# Patient Record
Sex: MALE | Race: WHITE
[De-identification: names, ages, dates, MRNs, and addresses within clinical notes are randomized per-mention and may not be internally consistent; named-entity substitution may affect disease eponyms.]

---

## 2020-10-27 ENCOUNTER — HOSPITAL ENCOUNTER (INPATIENT)
Dept: HOSPITAL 8 - ED | Age: 50
LOS: 3 days | Discharge: LEFT BEFORE DISCHARGE | DRG: 190 | End: 2020-10-30
Attending: INTERNAL MEDICINE | Admitting: FAMILY MEDICINE
Payer: MEDICAID

## 2020-10-27 VITALS — BODY MASS INDEX: 31.45 KG/M2 | WEIGHT: 200.4 LBS | HEIGHT: 67 IN

## 2020-10-27 DIAGNOSIS — F15.90: ICD-10-CM

## 2020-10-27 DIAGNOSIS — I50.31: ICD-10-CM

## 2020-10-27 DIAGNOSIS — Z82.49: ICD-10-CM

## 2020-10-27 DIAGNOSIS — I51.3: ICD-10-CM

## 2020-10-27 DIAGNOSIS — I25.10: ICD-10-CM

## 2020-10-27 DIAGNOSIS — I42.0: ICD-10-CM

## 2020-10-27 DIAGNOSIS — I26.99: ICD-10-CM

## 2020-10-27 DIAGNOSIS — I21.A1: Primary | ICD-10-CM

## 2020-10-27 DIAGNOSIS — E78.5: ICD-10-CM

## 2020-10-27 DIAGNOSIS — E87.1: ICD-10-CM

## 2020-10-27 DIAGNOSIS — N17.9: ICD-10-CM

## 2020-10-27 DIAGNOSIS — I27.20: ICD-10-CM

## 2020-10-27 DIAGNOSIS — E87.6: ICD-10-CM

## 2020-10-27 DIAGNOSIS — F17.210: ICD-10-CM

## 2020-10-27 DIAGNOSIS — Z91.19: ICD-10-CM

## 2020-10-27 DIAGNOSIS — K76.1: ICD-10-CM

## 2020-10-27 DIAGNOSIS — Z95.5: ICD-10-CM

## 2020-10-27 DIAGNOSIS — I25.5: ICD-10-CM

## 2020-10-27 DIAGNOSIS — Z91.14: ICD-10-CM

## 2020-10-27 LAB
BASOPHILS # BLD AUTO: 0 X10^3/UL (ref 0–0.1)
BASOPHILS NFR BLD AUTO: 0 % (ref 0–1)
EOSINOPHIL # BLD AUTO: 0.2 X10^3/UL (ref 0–0.4)
EOSINOPHIL NFR BLD AUTO: 2 % (ref 1–7)
ERYTHROCYTE [DISTWIDTH] IN BLOOD BY AUTOMATED COUNT: 14.9 % (ref 9.4–14.8)
LYMPHOCYTES # BLD AUTO: 2.2 X10^3/UL (ref 1–3.4)
LYMPHOCYTES NFR BLD AUTO: 27 % (ref 22–44)
MCH RBC QN AUTO: 30.6 PG (ref 27.5–34.5)
MCHC RBC AUTO-ENTMCNC: 32.9 G/DL (ref 33.2–36.2)
MD: NO
MONOCYTES # BLD AUTO: 0.9 X10^3/UL (ref 0.2–0.8)
MONOCYTES NFR BLD AUTO: 11 % (ref 2–9)
NEUTROPHILS # BLD AUTO: 5 X10^3/UL (ref 1.8–6.8)
NEUTROPHILS NFR BLD AUTO: 60 % (ref 42–75)
PLATELET # BLD AUTO: 220 X10^3/UL (ref 130–400)
PMV BLD AUTO: 9.7 FL (ref 7.4–10.4)
RBC # BLD AUTO: 5.39 X10^6/UL (ref 4.38–5.82)
TROPONIN I SERPL-MCNC: 0.25 NG/ML (ref 0–0.04)
TROPONIN I SERPL-MCNC: 0.27 NG/ML (ref 0–0.04)

## 2020-10-27 PROCEDURE — 80053 COMPREHEN METABOLIC PANEL: CPT

## 2020-10-27 PROCEDURE — 85610 PROTHROMBIN TIME: CPT

## 2020-10-27 PROCEDURE — 36415 COLL VENOUS BLD VENIPUNCTURE: CPT

## 2020-10-27 PROCEDURE — 93306 TTE W/DOPPLER COMPLETE: CPT

## 2020-10-27 PROCEDURE — 80061 LIPID PANEL: CPT

## 2020-10-27 PROCEDURE — 93005 ELECTROCARDIOGRAM TRACING: CPT

## 2020-10-27 PROCEDURE — 80048 BASIC METABOLIC PNL TOTAL CA: CPT

## 2020-10-27 PROCEDURE — 99285 EMERGENCY DEPT VISIT HI MDM: CPT

## 2020-10-27 PROCEDURE — 85520 HEPARIN ASSAY: CPT

## 2020-10-27 PROCEDURE — 93970 EXTREMITY STUDY: CPT

## 2020-10-27 PROCEDURE — 84443 ASSAY THYROID STIM HORMONE: CPT

## 2020-10-27 PROCEDURE — 84484 ASSAY OF TROPONIN QUANT: CPT

## 2020-10-27 PROCEDURE — 80076 HEPATIC FUNCTION PANEL: CPT

## 2020-10-27 PROCEDURE — 71275 CT ANGIOGRAPHY CHEST: CPT

## 2020-10-27 PROCEDURE — 71045 X-RAY EXAM CHEST 1 VIEW: CPT

## 2020-10-27 PROCEDURE — 85025 COMPLETE CBC W/AUTO DIFF WBC: CPT

## 2020-10-27 RX ADMIN — ENOXAPARIN SODIUM SCH MG: 40 INJECTION SUBCUTANEOUS at 17:30

## 2020-10-27 NOTE — NUR
RN called Dr. Salazar, admitting MD. Troponin's trending down. No complaints of SOB 
or CP. Told to stop heparin gtt and hold Lovenox dose until tomorrow. WCTM.

## 2020-10-27 NOTE — NUR
PT SENT FROM [facility]. Hx MI, CHF, METH USE. HASN'T TAKEN LASIX x 2 MONTHS UNTIL 
2 DAYS AGO. STATES "IT WASN'T WORKING" AND PRESENTED TO ER ORIGINALLY WITH SOB. 
TROP 0.26, BNP N933, GIVEN 324MG ASA, 2MG MORPHINE, AND A SHOT OF TORADOL. 
ARRIVES AFTER BOLUS OF HEPARIN RUNNING NOW AT11.7 U/HR. SENDING FACILITY 
REPORTS EKG SHOWS ANTERIOR MI, WITH RECENT Hx OF MI WITHIN LAST MONTH OR SO. PT 
STATES HE STOPPED USING METH 4 DAYS AGO. DENIES PAIN. 18G LAC

## 2020-10-27 NOTE — NUR
RN taking over for patient. Pt is on heparin gtt from transfering facility. RN 
attempted to discuss with EDMD of whether to keep the heparin gtt going. Told 
to call hospitalist.

## 2020-10-28 VITALS — SYSTOLIC BLOOD PRESSURE: 96 MMHG | DIASTOLIC BLOOD PRESSURE: 67 MMHG

## 2020-10-28 VITALS — SYSTOLIC BLOOD PRESSURE: 105 MMHG | DIASTOLIC BLOOD PRESSURE: 47 MMHG

## 2020-10-28 LAB
ALBUMIN SERPL-MCNC: 3.1 G/DL (ref 3.4–5)
ALP SERPL-CCNC: 140 U/L (ref 45–117)
ALT SERPL-CCNC: 250 U/L (ref 12–78)
ANION GAP SERPL CALC-SCNC: 10 MMOL/L (ref 5–15)
ANION GAP SERPL CALC-SCNC: 8 MMOL/L (ref 5–15)
BASOPHILS # BLD AUTO: 0.1 X10^3/UL (ref 0–0.1)
BASOPHILS NFR BLD AUTO: 1 % (ref 0–1)
BILIRUB SERPL-MCNC: 3.5 MG/DL (ref 0.2–1)
CALCIUM SERPL-MCNC: 9.1 MG/DL (ref 8.5–10.1)
CALCIUM SERPL-MCNC: 9.4 MG/DL (ref 8.5–10.1)
CHLORIDE SERPL-SCNC: 104 MMOL/L (ref 98–107)
CHLORIDE SERPL-SCNC: 104 MMOL/L (ref 98–107)
CREAT SERPL-MCNC: 1.29 MG/DL (ref 0.7–1.3)
CREAT SERPL-MCNC: 1.41 MG/DL (ref 0.7–1.3)
EOSINOPHIL # BLD AUTO: 0.3 X10^3/UL (ref 0–0.4)
EOSINOPHIL NFR BLD AUTO: 4 % (ref 1–7)
ERYTHROCYTE [DISTWIDTH] IN BLOOD BY AUTOMATED COUNT: 15.1 % (ref 9.4–14.8)
LYMPHOCYTES # BLD AUTO: 1.6 X10^3/UL (ref 1–3.4)
LYMPHOCYTES NFR BLD AUTO: 18 % (ref 22–44)
MCH RBC QN AUTO: 30.4 PG (ref 27.5–34.5)
MCHC RBC AUTO-ENTMCNC: 33 G/DL (ref 33.2–36.2)
MD: (no result)
MONOCYTES # BLD AUTO: 0.9 X10^3/UL (ref 0.2–0.8)
MONOCYTES NFR BLD AUTO: 10 % (ref 2–9)
NEUTROPHILS # BLD AUTO: 5.9 X10^3/UL (ref 1.8–6.8)
NEUTROPHILS NFR BLD AUTO: 67 % (ref 42–75)
PLATELET # BLD AUTO: 214 X10^3/UL (ref 130–400)
PMV BLD AUTO: 10.1 FL (ref 7.4–10.4)
PROT SERPL-MCNC: 6.8 G/DL (ref 6.4–8.2)
RBC # BLD AUTO: 5.37 X10^6/UL (ref 4.38–5.82)
TROPONIN I SERPL-MCNC: 0.26 NG/ML (ref 0–0.04)

## 2020-10-28 RX ADMIN — ENOXAPARIN SODIUM SCH MG: 40 INJECTION SUBCUTANEOUS at 18:03

## 2020-10-28 RX ADMIN — ISOSORBIDE MONONITRATE SCH MG: 30 TABLET, EXTENDED RELEASE ORAL at 09:20

## 2020-10-28 RX ADMIN — FUROSEMIDE SCH MG: 10 INJECTION, SOLUTION INTRAMUSCULAR; INTRAVENOUS at 09:20

## 2020-10-28 RX ADMIN — SPIRONOLACTONE SCH MG: 25 TABLET ORAL at 09:20

## 2020-10-28 RX ADMIN — ATORVASTATIN CALCIUM SCH MG: 40 TABLET, FILM COATED ORAL at 20:53

## 2020-10-28 RX ADMIN — LISINOPRIL SCH MG: 5 TABLET ORAL at 12:30

## 2020-10-28 RX ADMIN — PRASUGREL SCH MG: 10 TABLET, FILM COATED ORAL at 09:20

## 2020-10-28 RX ADMIN — ASPIRIN 81 MG SCH MG: 81 TABLET ORAL at 09:20

## 2020-10-28 NOTE — NUR
THROUGHPUT RN: DR. BAEZ'S ADMIT ORDER NOTED TO NOT HAVE LEVEL OF CARE. DISCUSSED 
W/ [facility] DR. PANDA. NEW ORDERS FOR PT TO BE ADMITTED TO CARDIAC TELEMETRY. VERBAL 
READ BACK.

## 2020-10-28 NOTE — NUR
REPORT FROM NOC RN, PT RESTING ON [employer] AT THIS TIME, NAD NOTED. VSS. SLIP 
SENT TO PHARM FOR AM MEDS, MEAL TRAY ORDERED

## 2020-10-29 VITALS — SYSTOLIC BLOOD PRESSURE: 109 MMHG | DIASTOLIC BLOOD PRESSURE: 81 MMHG

## 2020-10-29 VITALS — DIASTOLIC BLOOD PRESSURE: 71 MMHG | SYSTOLIC BLOOD PRESSURE: 101 MMHG

## 2020-10-29 VITALS — DIASTOLIC BLOOD PRESSURE: 87 MMHG | SYSTOLIC BLOOD PRESSURE: 118 MMHG

## 2020-10-29 VITALS — DIASTOLIC BLOOD PRESSURE: 83 MMHG | SYSTOLIC BLOOD PRESSURE: 109 MMHG

## 2020-10-29 VITALS — SYSTOLIC BLOOD PRESSURE: 111 MMHG | DIASTOLIC BLOOD PRESSURE: 80 MMHG

## 2020-10-29 LAB
ANION GAP SERPL CALC-SCNC: 9 MMOL/L (ref 5–15)
CALCIUM SERPL-MCNC: 8.6 MG/DL (ref 8.5–10.1)
CHLORIDE SERPL-SCNC: 105 MMOL/L (ref 98–107)
CHOL/HDL RATIO: 4.6
CREAT SERPL-MCNC: 1.17 MG/DL (ref 0.7–1.3)
HDL CHOL %: 22 % (ref 26–37)
HDL CHOLESTEROL (DIRECT): 23 MG/DL (ref 40–60)
LDL CHOLESTEROL,CALCULATED: 63 MG/DL (ref 54–169)
LDLC/HDLC SERPL: 2.7 {RATIO} (ref 0.5–3)
TRIGL SERPL-MCNC: 99 MG/DL (ref 50–200)
VLDLC SERPL CALC-MCNC: 20 MG/DL (ref 0–25)

## 2020-10-29 RX ADMIN — ISOSORBIDE MONONITRATE SCH MG: 30 TABLET, EXTENDED RELEASE ORAL at 12:01

## 2020-10-29 RX ADMIN — LISINOPRIL SCH MG: 5 TABLET ORAL at 12:01

## 2020-10-29 RX ADMIN — FUROSEMIDE SCH MG: 10 INJECTION, SOLUTION INTRAMUSCULAR; INTRAVENOUS at 12:00

## 2020-10-29 RX ADMIN — ATORVASTATIN CALCIUM SCH MG: 40 TABLET, FILM COATED ORAL at 21:18

## 2020-10-29 RX ADMIN — SPIRONOLACTONE SCH MG: 25 TABLET ORAL at 12:01

## 2020-10-29 RX ADMIN — HEPARIN SODIUM PRN UNITS: 5000 INJECTION, SOLUTION INTRAVENOUS; SUBCUTANEOUS at 21:18

## 2020-10-29 RX ADMIN — Medication SCH MG: at 04:51

## 2020-10-29 RX ADMIN — PRASUGREL SCH MG: 10 TABLET, FILM COATED ORAL at 12:01

## 2020-10-29 RX ADMIN — ASPIRIN 81 MG SCH MG: 81 TABLET ORAL at 12:01

## 2020-10-30 VITALS — SYSTOLIC BLOOD PRESSURE: 101 MMHG | DIASTOLIC BLOOD PRESSURE: 66 MMHG

## 2020-10-30 VITALS — DIASTOLIC BLOOD PRESSURE: 73 MMHG | SYSTOLIC BLOOD PRESSURE: 102 MMHG

## 2020-10-30 VITALS — DIASTOLIC BLOOD PRESSURE: 66 MMHG | SYSTOLIC BLOOD PRESSURE: 96 MMHG

## 2020-10-30 VITALS — SYSTOLIC BLOOD PRESSURE: 100 MMHG | DIASTOLIC BLOOD PRESSURE: 69 MMHG

## 2020-10-30 LAB
ALBUMIN SERPL-MCNC: 3 G/DL (ref 3.4–5)
ALP SERPL-CCNC: 155 U/L (ref 45–117)
ALT SERPL-CCNC: 218 U/L (ref 12–78)
ANION GAP SERPL CALC-SCNC: 10 MMOL/L (ref 5–15)
BASOPHILS # BLD AUTO: 0.1 X10^3/UL (ref 0–0.1)
BASOPHILS NFR BLD AUTO: 1 % (ref 0–1)
BILIRUB SERPL-MCNC: 3.1 MG/DL (ref 0.2–1)
CALCIUM SERPL-MCNC: 8.4 MG/DL (ref 8.5–10.1)
CHLORIDE SERPL-SCNC: 102 MMOL/L (ref 98–107)
CREAT SERPL-MCNC: 0.98 MG/DL (ref 0.7–1.3)
EOSINOPHIL # BLD AUTO: 0.4 X10^3/UL (ref 0–0.4)
EOSINOPHIL NFR BLD AUTO: 5 % (ref 1–7)
ERYTHROCYTE [DISTWIDTH] IN BLOOD BY AUTOMATED COUNT: 14.5 % (ref 9.4–14.8)
INR PPP: 1.33 (ref 0.93–1.1)
LYMPHOCYTES # BLD AUTO: 1.7 X10^3/UL (ref 1–3.4)
LYMPHOCYTES NFR BLD AUTO: 21 % (ref 22–44)
MCH RBC QN AUTO: 30.6 PG (ref 27.5–34.5)
MCHC RBC AUTO-ENTMCNC: 33.3 G/DL (ref 33.2–36.2)
MD: NO
MONOCYTES # BLD AUTO: 0.7 X10^3/UL (ref 0.2–0.8)
MONOCYTES NFR BLD AUTO: 9 % (ref 2–9)
NEUTROPHILS # BLD AUTO: 5.1 X10^3/UL (ref 1.8–6.8)
NEUTROPHILS NFR BLD AUTO: 65 % (ref 42–75)
PLATELET # BLD AUTO: 210 X10^3/UL (ref 130–400)
PMV BLD AUTO: 10 FL (ref 7.4–10.4)
PROT SERPL-MCNC: 6.8 G/DL (ref 6.4–8.2)
PROTHROMBIN TIME: 14.1 SECONDS (ref 9.6–11.5)
RBC # BLD AUTO: 4.96 X10^6/UL (ref 4.38–5.82)

## 2020-10-30 RX ADMIN — PRASUGREL SCH MG: 10 TABLET, FILM COATED ORAL at 10:19

## 2020-10-30 RX ADMIN — SPIRONOLACTONE SCH MG: 25 TABLET ORAL at 10:19

## 2020-10-30 RX ADMIN — ISOSORBIDE MONONITRATE SCH MG: 30 TABLET, EXTENDED RELEASE ORAL at 10:19

## 2020-10-30 RX ADMIN — FUROSEMIDE SCH MG: 10 INJECTION, SOLUTION INTRAMUSCULAR; INTRAVENOUS at 10:19

## 2020-10-30 RX ADMIN — LISINOPRIL SCH MG: 5 TABLET ORAL at 10:19

## 2020-10-30 RX ADMIN — ASPIRIN 81 MG SCH MG: 81 TABLET ORAL at 10:19

## 2020-10-30 RX ADMIN — Medication SCH MG: at 06:08

## 2020-10-30 RX ADMIN — HEPARIN SODIUM PRN UNITS: 5000 INJECTION, SOLUTION INTRAVENOUS; SUBCUTANEOUS at 04:34

## 2020-11-08 ENCOUNTER — HOSPITAL ENCOUNTER (INPATIENT)
Facility: MEDICAL CENTER | Age: 50
LOS: 4 days | DRG: 291 | End: 2020-11-13
Attending: EMERGENCY MEDICINE | Admitting: INTERNAL MEDICINE
Payer: MEDICAID

## 2020-11-08 ENCOUNTER — APPOINTMENT (OUTPATIENT)
Dept: RADIOLOGY | Facility: MEDICAL CENTER | Age: 50
DRG: 291 | End: 2020-11-08
Attending: EMERGENCY MEDICINE
Payer: MEDICAID

## 2020-11-08 ENCOUNTER — APPOINTMENT (OUTPATIENT)
Dept: CARDIOLOGY | Facility: MEDICAL CENTER | Age: 50
DRG: 291 | End: 2020-11-08
Attending: EMERGENCY MEDICINE
Payer: MEDICAID

## 2020-11-08 DIAGNOSIS — K72.90 HEPATIC INSUFFICIENCY (HCC): ICD-10-CM

## 2020-11-08 DIAGNOSIS — I51.3: ICD-10-CM

## 2020-11-08 DIAGNOSIS — Z86.79 HISTORY OF CORONARY ARTERY DISEASE: ICD-10-CM

## 2020-11-08 DIAGNOSIS — I51.3 LEFT VENTRICULAR APICAL THROMBUS: ICD-10-CM

## 2020-11-08 DIAGNOSIS — I42.9 CARDIOMYOPATHY, UNSPECIFIED TYPE (HCC): ICD-10-CM

## 2020-11-08 DIAGNOSIS — R06.00 ACUTE DYSPNEA: ICD-10-CM

## 2020-11-08 DIAGNOSIS — R79.89 ELEVATED TROPONIN: ICD-10-CM

## 2020-11-08 DIAGNOSIS — I50.82 BIVENTRICULAR FAILURE (HCC): ICD-10-CM

## 2020-11-08 DIAGNOSIS — R10.13 ABDOMINAL PAIN, ACUTE, EPIGASTRIC: ICD-10-CM

## 2020-11-08 DIAGNOSIS — R60.9 PERIPHERAL EDEMA: ICD-10-CM

## 2020-11-08 DIAGNOSIS — I51.3 RIGHT VENTRICULAR THROMBUS: ICD-10-CM

## 2020-11-08 DIAGNOSIS — I95.9 HYPOTENSION, UNSPECIFIED HYPOTENSION TYPE: ICD-10-CM

## 2020-11-08 DIAGNOSIS — I51.3 LEFT VENTRICULAR THROMBOSIS: ICD-10-CM

## 2020-11-08 DIAGNOSIS — N17.9 ACUTE KIDNEY INJURY (HCC): ICD-10-CM

## 2020-11-08 DIAGNOSIS — J18.9 PNEUMONIA OF LEFT LOWER LOBE DUE TO INFECTIOUS ORGANISM: ICD-10-CM

## 2020-11-08 LAB
ALBUMIN SERPL BCP-MCNC: 3.5 G/DL (ref 3.2–4.9)
ALBUMIN/GLOB SERPL: 1 G/DL
ALP SERPL-CCNC: 209 U/L (ref 30–99)
ALT SERPL-CCNC: 847 U/L (ref 2–50)
ANION GAP SERPL CALC-SCNC: 17 MMOL/L (ref 7–16)
APTT PPP: 33.8 SEC (ref 24.7–36)
AST SERPL-CCNC: 685 U/L (ref 12–45)
BASOPHILS # BLD AUTO: 1 % (ref 0–1.8)
BASOPHILS # BLD: 0.09 K/UL (ref 0–0.12)
BILIRUB SERPL-MCNC: 2.8 MG/DL (ref 0.1–1.5)
BUN SERPL-MCNC: 50 MG/DL (ref 8–22)
CALCIUM SERPL-MCNC: 9.1 MG/DL (ref 8.5–10.5)
CHLORIDE SERPL-SCNC: 90 MMOL/L (ref 96–112)
CO2 SERPL-SCNC: 21 MMOL/L (ref 20–33)
COVID ORDER STATUS COVID19: NORMAL
CREAT SERPL-MCNC: 1.75 MG/DL (ref 0.5–1.4)
EKG IMPRESSION: NORMAL
EOSINOPHIL # BLD AUTO: 0.35 K/UL (ref 0–0.51)
EOSINOPHIL NFR BLD: 3.9 % (ref 0–6.9)
ERYTHROCYTE [DISTWIDTH] IN BLOOD BY AUTOMATED COUNT: 46.4 FL (ref 35.9–50)
FLUAV RNA SPEC QL NAA+PROBE: NEGATIVE
FLUBV RNA SPEC QL NAA+PROBE: NEGATIVE
GLOBULIN SER CALC-MCNC: 3.5 G/DL (ref 1.9–3.5)
GLUCOSE SERPL-MCNC: 112 MG/DL (ref 65–99)
HCT VFR BLD AUTO: 50.6 % (ref 42–52)
HGB BLD-MCNC: 16.4 G/DL (ref 14–18)
IMM GRANULOCYTES # BLD AUTO: 0.04 K/UL (ref 0–0.11)
IMM GRANULOCYTES NFR BLD AUTO: 0.4 % (ref 0–0.9)
INR PPP: 1.53 (ref 0.87–1.13)
LACTATE BLD-SCNC: 2.9 MMOL/L (ref 0.5–2)
LIPASE SERPL-CCNC: 72 U/L (ref 11–82)
LV EJECT FRACT  99904: 10
LYMPHOCYTES # BLD AUTO: 1.94 K/UL (ref 1–4.8)
LYMPHOCYTES NFR BLD: 21.4 % (ref 22–41)
MCH RBC QN AUTO: 29.2 PG (ref 27–33)
MCHC RBC AUTO-ENTMCNC: 32.4 G/DL (ref 33.7–35.3)
MCV RBC AUTO: 90.2 FL (ref 81.4–97.8)
MONOCYTES # BLD AUTO: 1.07 K/UL (ref 0–0.85)
MONOCYTES NFR BLD AUTO: 11.8 % (ref 0–13.4)
NEUTROPHILS # BLD AUTO: 5.58 K/UL (ref 1.82–7.42)
NEUTROPHILS NFR BLD: 61.5 % (ref 44–72)
NRBC # BLD AUTO: 0 K/UL
NRBC BLD-RTO: 0 /100 WBC
NT-PROBNP SERPL IA-MCNC: 6863 PG/ML (ref 0–125)
PLATELET # BLD AUTO: 290 K/UL (ref 164–446)
PMV BLD AUTO: 11.9 FL (ref 9–12.9)
POTASSIUM SERPL-SCNC: 4 MMOL/L (ref 3.6–5.5)
PROT SERPL-MCNC: 7 G/DL (ref 6–8.2)
PROTHROMBIN TIME: 18.9 SEC (ref 12–14.6)
RBC # BLD AUTO: 5.61 M/UL (ref 4.7–6.1)
RSV RNA SPEC QL NAA+PROBE: NEGATIVE
SARS-COV-2 RNA RESP QL NAA+PROBE: NOTDETECTED
SODIUM SERPL-SCNC: 128 MMOL/L (ref 135–145)
SPECIMEN SOURCE: NORMAL
TROPONIN T SERPL-MCNC: 39 NG/L (ref 6–19)
TROPONIN T SERPL-MCNC: 44 NG/L (ref 6–19)
TSH SERPL DL<=0.005 MIU/L-ACNC: 12.7 UIU/ML (ref 0.38–5.33)
WBC # BLD AUTO: 9.1 K/UL (ref 4.8–10.8)

## 2020-11-08 PROCEDURE — 85025 COMPLETE CBC W/AUTO DIFF WBC: CPT

## 2020-11-08 PROCEDURE — 83605 ASSAY OF LACTIC ACID: CPT

## 2020-11-08 PROCEDURE — 700102 HCHG RX REV CODE 250 W/ 637 OVERRIDE(OP): Performed by: EMERGENCY MEDICINE

## 2020-11-08 PROCEDURE — 0240U HCHG SARS-COV-2 COVID-19 NFCT DS RESP RNA 3 TRGT MIC: CPT

## 2020-11-08 PROCEDURE — 87040 BLOOD CULTURE FOR BACTERIA: CPT

## 2020-11-08 PROCEDURE — 700111 HCHG RX REV CODE 636 W/ 250 OVERRIDE (IP): Performed by: EMERGENCY MEDICINE

## 2020-11-08 PROCEDURE — 83690 ASSAY OF LIPASE: CPT

## 2020-11-08 PROCEDURE — 99291 CRITICAL CARE FIRST HOUR: CPT

## 2020-11-08 PROCEDURE — 93005 ELECTROCARDIOGRAM TRACING: CPT | Performed by: EMERGENCY MEDICINE

## 2020-11-08 PROCEDURE — 80074 ACUTE HEPATITIS PANEL: CPT

## 2020-11-08 PROCEDURE — 83880 ASSAY OF NATRIURETIC PEPTIDE: CPT

## 2020-11-08 PROCEDURE — 93005 ELECTROCARDIOGRAM TRACING: CPT

## 2020-11-08 PROCEDURE — 84443 ASSAY THYROID STIM HORMONE: CPT

## 2020-11-08 PROCEDURE — 700105 HCHG RX REV CODE 258: Performed by: EMERGENCY MEDICINE

## 2020-11-08 PROCEDURE — 85520 HEPARIN ASSAY: CPT

## 2020-11-08 PROCEDURE — 96367 TX/PROPH/DG ADDL SEQ IV INF: CPT

## 2020-11-08 PROCEDURE — 85610 PROTHROMBIN TIME: CPT

## 2020-11-08 PROCEDURE — 93306 TTE W/DOPPLER COMPLETE: CPT

## 2020-11-08 PROCEDURE — 700111 HCHG RX REV CODE 636 W/ 250 OVERRIDE (IP): Performed by: INTERNAL MEDICINE

## 2020-11-08 PROCEDURE — 99244 OFF/OP CNSLTJ NEW/EST MOD 40: CPT | Performed by: INTERNAL MEDICINE

## 2020-11-08 PROCEDURE — 700117 HCHG RX CONTRAST REV CODE 255: Performed by: EMERGENCY MEDICINE

## 2020-11-08 PROCEDURE — 71045 X-RAY EXAM CHEST 1 VIEW: CPT

## 2020-11-08 PROCEDURE — 0241U HCHG SARS-COV-2 COVID-19 NFCT DS RESP RNA 4 TRGT MIC: CPT

## 2020-11-08 PROCEDURE — 96375 TX/PRO/DX INJ NEW DRUG ADDON: CPT

## 2020-11-08 PROCEDURE — 85730 THROMBOPLASTIN TIME PARTIAL: CPT

## 2020-11-08 PROCEDURE — 93306 TTE W/DOPPLER COMPLETE: CPT | Mod: 26 | Performed by: INTERNAL MEDICINE

## 2020-11-08 PROCEDURE — C9803 HOPD COVID-19 SPEC COLLECT: HCPCS | Performed by: EMERGENCY MEDICINE

## 2020-11-08 PROCEDURE — 84484 ASSAY OF TROPONIN QUANT: CPT

## 2020-11-08 PROCEDURE — A9270 NON-COVERED ITEM OR SERVICE: HCPCS | Performed by: EMERGENCY MEDICINE

## 2020-11-08 PROCEDURE — 71275 CT ANGIOGRAPHY CHEST: CPT

## 2020-11-08 PROCEDURE — 99291 CRITICAL CARE FIRST HOUR: CPT | Performed by: INTERNAL MEDICINE

## 2020-11-08 PROCEDURE — 96368 THER/DIAG CONCURRENT INF: CPT

## 2020-11-08 PROCEDURE — 96365 THER/PROPH/DIAG IV INF INIT: CPT

## 2020-11-08 PROCEDURE — 80053 COMPREHEN METABOLIC PANEL: CPT

## 2020-11-08 PROCEDURE — 36415 COLL VENOUS BLD VENIPUNCTURE: CPT

## 2020-11-08 RX ORDER — PRASUGREL 10 MG/1
10 TABLET, FILM COATED ORAL DAILY
Status: ON HOLD | COMMUNITY
End: 2020-11-13

## 2020-11-08 RX ORDER — SPIRONOLACTONE 25 MG/1
25 TABLET ORAL DAILY
Status: ON HOLD | COMMUNITY
End: 2020-11-13

## 2020-11-08 RX ORDER — HEPARIN SODIUM 1000 [USP'U]/ML
80 INJECTION, SOLUTION INTRAVENOUS; SUBCUTANEOUS ONCE
Status: DISCONTINUED | OUTPATIENT
Start: 2020-11-09 | End: 2020-11-08

## 2020-11-08 RX ORDER — HEPARIN SODIUM 1000 [USP'U]/ML
40 INJECTION, SOLUTION INTRAVENOUS; SUBCUTANEOUS PRN
Status: DISCONTINUED | OUTPATIENT
Start: 2020-11-08 | End: 2020-11-08

## 2020-11-08 RX ORDER — AZITHROMYCIN 500 MG/1
500 INJECTION, POWDER, LYOPHILIZED, FOR SOLUTION INTRAVENOUS ONCE
Status: COMPLETED | OUTPATIENT
Start: 2020-11-08 | End: 2020-11-09

## 2020-11-08 RX ORDER — SODIUM CHLORIDE 9 MG/ML
500 INJECTION, SOLUTION INTRAVENOUS ONCE
Status: COMPLETED | OUTPATIENT
Start: 2020-11-08 | End: 2020-11-08

## 2020-11-08 RX ORDER — FUROSEMIDE 20 MG/1
20 TABLET ORAL DAILY
Status: ON HOLD | COMMUNITY
End: 2020-11-13

## 2020-11-08 RX ORDER — HEPARIN SODIUM 5000 [USP'U]/100ML
0-30 INJECTION, SOLUTION INTRAVENOUS CONTINUOUS
Status: DISCONTINUED | OUTPATIENT
Start: 2020-11-09 | End: 2020-11-08

## 2020-11-08 RX ORDER — HEPARIN SODIUM 1000 [USP'U]/ML
80 INJECTION, SOLUTION INTRAVENOUS; SUBCUTANEOUS ONCE
Status: COMPLETED | OUTPATIENT
Start: 2020-11-08 | End: 2020-11-08

## 2020-11-08 RX ORDER — ATORVASTATIN CALCIUM 20 MG/1
40 TABLET, FILM COATED ORAL NIGHTLY
Status: ON HOLD | COMMUNITY
End: 2020-11-13 | Stop reason: SDUPTHER

## 2020-11-08 RX ORDER — ASPIRIN 81 MG/1
324 TABLET, CHEWABLE ORAL ONCE
Status: COMPLETED | OUTPATIENT
Start: 2020-11-08 | End: 2020-11-08

## 2020-11-08 RX ORDER — HEPARIN SODIUM 5000 [USP'U]/100ML
0-30 INJECTION, SOLUTION INTRAVENOUS CONTINUOUS
Status: DISCONTINUED | OUTPATIENT
Start: 2020-11-08 | End: 2020-11-13 | Stop reason: HOSPADM

## 2020-11-08 RX ORDER — HEPARIN SODIUM 1000 [USP'U]/ML
40 INJECTION, SOLUTION INTRAVENOUS; SUBCUTANEOUS PRN
Status: DISCONTINUED | OUTPATIENT
Start: 2020-11-08 | End: 2020-11-13 | Stop reason: HOSPADM

## 2020-11-08 RX ADMIN — SODIUM CHLORIDE 500 ML: 9 INJECTION, SOLUTION INTRAVENOUS at 21:43

## 2020-11-08 RX ADMIN — AZITHROMYCIN MONOHYDRATE 500 MG: 500 INJECTION, POWDER, LYOPHILIZED, FOR SOLUTION INTRAVENOUS at 23:39

## 2020-11-08 RX ADMIN — FAMOTIDINE 20 MG: 10 INJECTION INTRAVENOUS at 21:58

## 2020-11-08 RX ADMIN — CEFTRIAXONE SODIUM 2 G: 2 INJECTION, POWDER, FOR SOLUTION INTRAMUSCULAR; INTRAVENOUS at 22:35

## 2020-11-08 RX ADMIN — HEPARIN SODIUM 18 UNITS/KG/HR: 5000 INJECTION, SOLUTION INTRAVENOUS at 23:52

## 2020-11-08 RX ADMIN — IOHEXOL 89 ML: 350 INJECTION, SOLUTION INTRAVENOUS at 21:44

## 2020-11-08 RX ADMIN — HUMAN ALBUMIN MICROSPHERES AND PERFLUTREN 3 ML: 10; .22 INJECTION, SOLUTION INTRAVENOUS at 23:42

## 2020-11-08 RX ADMIN — ASPIRIN 324 MG: 81 TABLET, CHEWABLE ORAL at 22:29

## 2020-11-08 RX ADMIN — HEPARIN SODIUM 6500 UNITS: 1000 INJECTION, SOLUTION INTRAVENOUS; SUBCUTANEOUS at 23:50

## 2020-11-08 ASSESSMENT — FIBROSIS 4 INDEX: FIB4 SCORE: 4.14

## 2020-11-09 ENCOUNTER — APPOINTMENT (OUTPATIENT)
Dept: RADIOLOGY | Facility: MEDICAL CENTER | Age: 50
DRG: 291 | End: 2020-11-09
Attending: INTERNAL MEDICINE
Payer: MEDICAID

## 2020-11-09 PROBLEM — J18.9 PNEUMONIA: Status: ACTIVE | Noted: 2020-11-09

## 2020-11-09 PROBLEM — R79.89 ELEVATED TSH: Status: ACTIVE | Noted: 2020-11-09

## 2020-11-09 PROBLEM — I51.3: Status: ACTIVE | Noted: 2020-11-09

## 2020-11-09 PROBLEM — R94.31 QT PROLONGATION: Status: ACTIVE | Noted: 2020-11-09

## 2020-11-09 PROBLEM — I51.3 LEFT VENTRICULAR APICAL THROMBUS: Status: ACTIVE | Noted: 2020-11-09

## 2020-11-09 PROBLEM — R74.01 ELEVATED TRANSAMINASE LEVEL: Status: ACTIVE | Noted: 2020-11-09

## 2020-11-09 PROBLEM — I50.20 HFREF (HEART FAILURE WITH REDUCED EJECTION FRACTION) (HCC): Status: ACTIVE | Noted: 2020-11-09

## 2020-11-09 PROBLEM — I50.82 BIVENTRICULAR FAILURE (HCC): Status: ACTIVE | Noted: 2020-11-09

## 2020-11-09 PROBLEM — R07.9 PAIN IN THE CHEST: Status: ACTIVE | Noted: 2020-11-09

## 2020-11-09 PROBLEM — N17.9 AKI (ACUTE KIDNEY INJURY) (HCC): Status: ACTIVE | Noted: 2020-11-09

## 2020-11-09 PROBLEM — F15.10 METHAMPHETAMINE ABUSE (HCC): Status: ACTIVE | Noted: 2020-11-09

## 2020-11-09 PROBLEM — K27.9 PEPTIC ULCER DISEASE: Status: ACTIVE | Noted: 2020-11-09

## 2020-11-09 PROBLEM — I25.10 CAD (CORONARY ARTERY DISEASE): Status: ACTIVE | Noted: 2020-11-09

## 2020-11-09 PROBLEM — I51.89 LEFT VENTRICULAR SYSTOLIC DYSFUNCTION, NYHA CLASS 3: Status: ACTIVE | Noted: 2020-11-09

## 2020-11-09 LAB
ALBUMIN SERPL BCP-MCNC: 3.1 G/DL (ref 3.2–4.9)
ALBUMIN/GLOB SERPL: 1 G/DL
ALP SERPL-CCNC: 176 U/L (ref 30–99)
ALT SERPL-CCNC: 781 U/L (ref 2–50)
AMMONIA PLAS-SCNC: 16 UMOL/L (ref 11–45)
AMPHET UR QL SCN: POSITIVE
ANION GAP SERPL CALC-SCNC: 15 MMOL/L (ref 7–16)
AST SERPL-CCNC: 583 U/L (ref 12–45)
BARBITURATES UR QL SCN: NEGATIVE
BENZODIAZ UR QL SCN: NEGATIVE
BILIRUB SERPL-MCNC: 1.9 MG/DL (ref 0.1–1.5)
BUN SERPL-MCNC: 35 MG/DL (ref 8–22)
BZE UR QL SCN: NEGATIVE
CALCIUM SERPL-MCNC: 8.4 MG/DL (ref 8.5–10.5)
CANNABINOIDS UR QL SCN: NEGATIVE
CHLORIDE SERPL-SCNC: 92 MMOL/L (ref 96–112)
CO2 SERPL-SCNC: 23 MMOL/L (ref 20–33)
CREAT SERPL-MCNC: 1.27 MG/DL (ref 0.5–1.4)
GLOBULIN SER CALC-MCNC: 3.2 G/DL (ref 1.9–3.5)
GLUCOSE SERPL-MCNC: 121 MG/DL (ref 65–99)
HAV IGM SERPL QL IA: NORMAL
HBV CORE IGM SER QL: NORMAL
HBV SURFACE AG SER QL: NORMAL
HCV AB SER QL: NORMAL
METHADONE UR QL SCN: NEGATIVE
OPIATES UR QL SCN: NEGATIVE
OXYCODONE UR QL SCN: NEGATIVE
PCP UR QL SCN: NEGATIVE
POTASSIUM SERPL-SCNC: 3.2 MMOL/L (ref 3.6–5.5)
PROPOXYPH UR QL SCN: NEGATIVE
PROT SERPL-MCNC: 6.3 G/DL (ref 6–8.2)
SODIUM SERPL-SCNC: 130 MMOL/L (ref 135–145)
T3 SERPL-MCNC: 59.3 NG/DL (ref 60–181)
T4 FREE SERPL-MCNC: 1.51 NG/DL (ref 0.93–1.7)
TROPONIN T SERPL-MCNC: 33 NG/L (ref 6–19)
TROPONIN T SERPL-MCNC: 38 NG/L (ref 6–19)
TROPONIN T SERPL-MCNC: 39 NG/L (ref 6–19)
UFH PPP CHRO-ACNC: 0.25 IU/ML
UFH PPP CHRO-ACNC: 0.46 IU/ML
UFH PPP CHRO-ACNC: <0.1 IU/ML

## 2020-11-09 PROCEDURE — 84439 ASSAY OF FREE THYROXINE: CPT

## 2020-11-09 PROCEDURE — 99291 CRITICAL CARE FIRST HOUR: CPT | Performed by: PSYCHIATRY & NEUROLOGY

## 2020-11-09 PROCEDURE — 85520 HEPARIN ASSAY: CPT

## 2020-11-09 PROCEDURE — 99254 IP/OBS CNSLTJ NEW/EST MOD 60: CPT | Performed by: THORACIC SURGERY (CARDIOTHORACIC VASCULAR SURGERY)

## 2020-11-09 PROCEDURE — A9270 NON-COVERED ITEM OR SERVICE: HCPCS | Performed by: INTERNAL MEDICINE

## 2020-11-09 PROCEDURE — 80307 DRUG TEST PRSMV CHEM ANLYZR: CPT

## 2020-11-09 PROCEDURE — 700101 HCHG RX REV CODE 250: Performed by: INTERNAL MEDICINE

## 2020-11-09 PROCEDURE — 700102 HCHG RX REV CODE 250 W/ 637 OVERRIDE(OP): Performed by: INTERNAL MEDICINE

## 2020-11-09 PROCEDURE — C9803 HOPD COVID-19 SPEC COLLECT: HCPCS | Performed by: STUDENT IN AN ORGANIZED HEALTH CARE EDUCATION/TRAINING PROGRAM

## 2020-11-09 PROCEDURE — 700111 HCHG RX REV CODE 636 W/ 250 OVERRIDE (IP): Performed by: INTERNAL MEDICINE

## 2020-11-09 PROCEDURE — 700105 HCHG RX REV CODE 258: Performed by: INTERNAL MEDICINE

## 2020-11-09 PROCEDURE — 770022 HCHG ROOM/CARE - ICU (200)

## 2020-11-09 PROCEDURE — 93970 EXTREMITY STUDY: CPT

## 2020-11-09 PROCEDURE — 96366 THER/PROPH/DIAG IV INF ADDON: CPT

## 2020-11-09 PROCEDURE — 76700 US EXAM ABDOM COMPLETE: CPT

## 2020-11-09 PROCEDURE — 84484 ASSAY OF TROPONIN QUANT: CPT

## 2020-11-09 PROCEDURE — 84480 ASSAY TRIIODOTHYRONINE (T3): CPT

## 2020-11-09 PROCEDURE — 82140 ASSAY OF AMMONIA: CPT

## 2020-11-09 PROCEDURE — 80053 COMPREHEN METABOLIC PANEL: CPT

## 2020-11-09 RX ORDER — AZITHROMYCIN 500 MG/5ML
500 INJECTION, POWDER, LYOPHILIZED, FOR SOLUTION INTRAVENOUS EVERY 24 HOURS
Status: DISCONTINUED | OUTPATIENT
Start: 2020-11-09 | End: 2020-11-09

## 2020-11-09 RX ORDER — OXYCODONE HYDROCHLORIDE 5 MG/1
2.5 TABLET ORAL
Status: DISCONTINUED | OUTPATIENT
Start: 2020-11-09 | End: 2020-11-13 | Stop reason: HOSPADM

## 2020-11-09 RX ORDER — ACETAMINOPHEN 325 MG/1
650 TABLET ORAL EVERY 6 HOURS PRN
Status: DISCONTINUED | OUTPATIENT
Start: 2020-11-09 | End: 2020-11-13 | Stop reason: HOSPADM

## 2020-11-09 RX ORDER — POLYETHYLENE GLYCOL 3350 17 G/17G
1 POWDER, FOR SOLUTION ORAL
Status: DISCONTINUED | OUTPATIENT
Start: 2020-11-09 | End: 2020-11-13 | Stop reason: HOSPADM

## 2020-11-09 RX ORDER — SPIRONOLACTONE 25 MG/1
25 TABLET ORAL DAILY
Status: DISCONTINUED | OUTPATIENT
Start: 2020-11-09 | End: 2020-11-09

## 2020-11-09 RX ORDER — PRASUGREL 10 MG/1
10 TABLET, FILM COATED ORAL DAILY
Status: DISCONTINUED | OUTPATIENT
Start: 2020-11-09 | End: 2020-11-13 | Stop reason: HOSPADM

## 2020-11-09 RX ORDER — NICOTINE 21 MG/24HR
14 PATCH, TRANSDERMAL 24 HOURS TRANSDERMAL
Status: DISCONTINUED | OUTPATIENT
Start: 2020-11-09 | End: 2020-11-13 | Stop reason: HOSPADM

## 2020-11-09 RX ORDER — OMEPRAZOLE 20 MG/1
20 CAPSULE, DELAYED RELEASE ORAL DAILY
Status: DISCONTINUED | OUTPATIENT
Start: 2020-11-09 | End: 2020-11-13 | Stop reason: HOSPADM

## 2020-11-09 RX ORDER — FUROSEMIDE 10 MG/ML
40 INJECTION INTRAMUSCULAR; INTRAVENOUS
Status: DISCONTINUED | OUTPATIENT
Start: 2020-11-09 | End: 2020-11-11

## 2020-11-09 RX ORDER — AMOXICILLIN 250 MG
2 CAPSULE ORAL 2 TIMES DAILY
Status: DISCONTINUED | OUTPATIENT
Start: 2020-11-09 | End: 2020-11-13 | Stop reason: HOSPADM

## 2020-11-09 RX ORDER — BISACODYL 10 MG
10 SUPPOSITORY, RECTAL RECTAL
Status: DISCONTINUED | OUTPATIENT
Start: 2020-11-09 | End: 2020-11-13 | Stop reason: HOSPADM

## 2020-11-09 RX ORDER — FUROSEMIDE 40 MG/1
20 TABLET ORAL DAILY
Status: DISCONTINUED | OUTPATIENT
Start: 2020-11-09 | End: 2020-11-09

## 2020-11-09 RX ORDER — SPIRONOLACTONE 25 MG/1
25 TABLET ORAL DAILY
Status: DISCONTINUED | OUTPATIENT
Start: 2020-11-10 | End: 2020-11-13 | Stop reason: HOSPADM

## 2020-11-09 RX ORDER — MORPHINE SULFATE 4 MG/ML
2 INJECTION, SOLUTION INTRAMUSCULAR; INTRAVENOUS
Status: DISCONTINUED | OUTPATIENT
Start: 2020-11-09 | End: 2020-11-13 | Stop reason: HOSPADM

## 2020-11-09 RX ORDER — OXYCODONE HYDROCHLORIDE 5 MG/1
5 TABLET ORAL
Status: DISCONTINUED | OUTPATIENT
Start: 2020-11-09 | End: 2020-11-13 | Stop reason: HOSPADM

## 2020-11-09 RX ORDER — CARVEDILOL 3.12 MG/1
3.12 TABLET ORAL 2 TIMES DAILY WITH MEALS
Status: DISCONTINUED | OUTPATIENT
Start: 2020-11-09 | End: 2020-11-13 | Stop reason: HOSPADM

## 2020-11-09 RX ORDER — ATORVASTATIN CALCIUM 40 MG/1
40 TABLET, FILM COATED ORAL NIGHTLY
Status: DISCONTINUED | OUTPATIENT
Start: 2020-11-09 | End: 2020-11-13 | Stop reason: HOSPADM

## 2020-11-09 RX ADMIN — FUROSEMIDE 20 MG: 40 TABLET ORAL at 05:51

## 2020-11-09 RX ADMIN — MORPHINE SULFATE 2 MG: 4 INJECTION INTRAVENOUS at 22:40

## 2020-11-09 RX ADMIN — NICOTINE 14 MG: 14 PATCH TRANSDERMAL at 05:53

## 2020-11-09 RX ADMIN — DOXYCYCLINE 100 MG: 100 INJECTION, POWDER, LYOPHILIZED, FOR SOLUTION INTRAVENOUS at 05:54

## 2020-11-09 RX ADMIN — HEPARIN SODIUM 20 UNITS/KG/HR: 5000 INJECTION, SOLUTION INTRAVENOUS at 15:22

## 2020-11-09 RX ADMIN — CARVEDILOL 3.12 MG: 3.12 TABLET, FILM COATED ORAL at 08:17

## 2020-11-09 RX ADMIN — SPIRONOLACTONE 25 MG: 25 TABLET ORAL at 05:53

## 2020-11-09 RX ADMIN — OXYCODONE 5 MG: 5 TABLET ORAL at 20:44

## 2020-11-09 RX ADMIN — DOXYCYCLINE 100 MG: 100 INJECTION, POWDER, LYOPHILIZED, FOR SOLUTION INTRAVENOUS at 17:27

## 2020-11-09 RX ADMIN — DOCUSATE SODIUM 50 MG AND SENNOSIDES 8.6 MG 2 TABLET: 8.6; 5 TABLET, FILM COATED ORAL at 05:51

## 2020-11-09 RX ADMIN — FUROSEMIDE 40 MG: 10 INJECTION, SOLUTION INTRAMUSCULAR; INTRAVENOUS at 08:17

## 2020-11-09 RX ADMIN — ATORVASTATIN CALCIUM 40 MG: 40 TABLET, FILM COATED ORAL at 19:46

## 2020-11-09 RX ADMIN — ATORVASTATIN CALCIUM 40 MG: 40 TABLET, FILM COATED ORAL at 02:05

## 2020-11-09 RX ADMIN — FUROSEMIDE 40 MG: 10 INJECTION, SOLUTION INTRAMUSCULAR; INTRAVENOUS at 15:21

## 2020-11-09 RX ADMIN — CEFTRIAXONE SODIUM 2 G: 2 INJECTION, POWDER, FOR SOLUTION INTRAMUSCULAR; INTRAVENOUS at 05:54

## 2020-11-09 RX ADMIN — CARVEDILOL 3.12 MG: 3.12 TABLET, FILM COATED ORAL at 17:27

## 2020-11-09 RX ADMIN — PRASUGREL 10 MG: 10 TABLET, FILM COATED ORAL at 05:53

## 2020-11-09 RX ADMIN — OMEPRAZOLE 20 MG: 20 CAPSULE, DELAYED RELEASE ORAL at 05:53

## 2020-11-09 RX ADMIN — DOCUSATE SODIUM 50 MG AND SENNOSIDES 8.6 MG 2 TABLET: 8.6; 5 TABLET, FILM COATED ORAL at 17:27

## 2020-11-09 ASSESSMENT — PAIN DESCRIPTION - PAIN TYPE
TYPE: ACUTE PAIN

## 2020-11-09 ASSESSMENT — COGNITIVE AND FUNCTIONAL STATUS - GENERAL
DRESSING REGULAR UPPER BODY CLOTHING: A LITTLE
SUGGESTED CMS G CODE MODIFIER DAILY ACTIVITY: CK
TOILETING: A LITTLE
DRESSING REGULAR LOWER BODY CLOTHING: A LITTLE
TURNING FROM BACK TO SIDE WHILE IN FLAT BAD: A LITTLE
HELP NEEDED FOR BATHING: A LITTLE
DAILY ACTIVITIY SCORE: 18
MOVING TO AND FROM BED TO CHAIR: A LITTLE
STANDING UP FROM CHAIR USING ARMS: A LITTLE
MOVING FROM LYING ON BACK TO SITTING ON SIDE OF FLAT BED: A LITTLE
PERSONAL GROOMING: A LITTLE
CLIMB 3 TO 5 STEPS WITH RAILING: A LITTLE
EATING MEALS: A LITTLE
MOBILITY SCORE: 18
SUGGESTED CMS G CODE MODIFIER MOBILITY: CK
WALKING IN HOSPITAL ROOM: A LITTLE

## 2020-11-09 ASSESSMENT — ENCOUNTER SYMPTOMS
SENSORY CHANGE: 0
COUGH: 1
SORE THROAT: 0
HEADACHES: 0
BLURRED VISION: 0
EYES NEGATIVE: 1
CHILLS: 0
SPUTUM PRODUCTION: 0
CONSTITUTIONAL NEGATIVE: 1
MUSCULOSKELETAL NEGATIVE: 1
CONSTIPATION: 0
BLOOD IN STOOL: 0
STRIDOR: 0
FEVER: 0
MYALGIAS: 0
NAUSEA: 0
DIARRHEA: 0
ABDOMINAL PAIN: 1
VOMITING: 0
DIZZINESS: 0
PALPITATIONS: 0
NEUROLOGICAL NEGATIVE: 1
FOCAL WEAKNESS: 0
SHORTNESS OF BREATH: 1

## 2020-11-09 ASSESSMENT — LIFESTYLE VARIABLES: SUBSTANCE_ABUSE: 1

## 2020-11-09 ASSESSMENT — FIBROSIS 4 INDEX: FIB4 SCORE: 4.14

## 2020-11-09 NOTE — ASSESSMENT & PLAN NOTE
11/12 BUN:46, Cr:1.69  Monitor bmp  unknown baseline Cr  Strict I's/O's  renal US showed no signs of obstruction, renal calculi or masses

## 2020-11-09 NOTE — ASSESSMENT & PLAN NOTE
?Cardiorenal vs other. Unknown baseline Cr  IVF  Renal dose meds, avoid nephrotoxins  Strict I/Os  Follow renal function  Renal US

## 2020-11-09 NOTE — ED NOTES
"Pt states \"I had a stent placed, moved away from [facility], then moved back have not been taking my pills\"  "

## 2020-11-09 NOTE — ED NOTES
Med rec  Updated and complete. Allergies reviewed.   No antibiotic use  In last 14 days.      Home pharmacy [facility]

## 2020-11-09 NOTE — ASSESSMENT & PLAN NOTE
CT PE showed left lower lobe consolidation with central cavitation, groundglass opacities in bilateral lower lung fields (left >right) and right pleural effusion  Negative for influenza and initial Covid test, repeat Covid test pending  Started on ceftriaxone and doxycycline (start date 11/8) stopped today. The opacities likely from prior PE, antibiotic stewardship recommended to stop abx.  May need a HRCT in the future  Cultures negative to date.

## 2020-11-09 NOTE — PROGRESS NOTES
[facility] ICU Progress Note      Admit Date: 11/8/2020  Resident(s): Grace Avila M.D.  Attending: KVNG Good/ Dr. Tyler     Date & Time:   11/9/2020   4:45 PM       Patient ID:    Name:             Matt Ray   YOB: 1970  Age:                 50 y.o.  male   MRN:               [id number]    Diagnosis:  Biventricular heart failure with biventricular thrombi    ID:  This is a 50-year-old male with a history of CAD s/p stenting,HFrEF, history of PE, medication noncompliance, methamphetamine and tobacco abuse and peptic ulcer disease, who presented to the ED with shortness of breath, nonproductive cough, worsening bilateral pedal edema and epigastric pain.  He was borderline hypotensive and tachycardic at the time of presentation.  Blood work-up showed troponin of 39, EKG showed sinus tachycardia and no acute ST segment changes, BNP was elevated at 6863, chest x-ray showed left lower lobe opacities, CT-PE showed no signs of pulmonary embolism, however it showed cardiac dilatation with thrombi in the right and left ventricles and left lower lobe consolidation with a central cavity and groundglass opacities (left >right).  Ultrasound of the lower extremity showed no signs of DVT.  Echocardiogram showed an EF of 10% and confirmed the presence of thrombi in both the right and left ventricles.  Ultrasound of the abdomen showed hepatic steatosis and cholelithiasis with no signs of cholecystitis.  Patient was started on Lasix, carvedilol, heparin infusion and ceftriaxone + doxycycline for treatment of community-acquired pneumonia.    Interval Events:  -No acute events overnight  -Patient continues to complain of mild shortness of breath, but denies chest pain  -No new complaints  -Continues to be on a heparin drip for biventricular thrombi  -Repeat Covid test pending      Consultants:  Cardiology  Pulmonary critical care      Procedures:  None    Review of Systems   Constitutional: Negative for chills and  fever.   HENT: Negative for congestion and sore throat.    Respiratory: Positive for cough and shortness of breath. Negative for sputum production.    Cardiovascular: Positive for leg swelling. Negative for chest pain and palpitations.   Gastrointestinal: Positive for abdominal pain. Negative for blood in stool, constipation, diarrhea, nausea and vomiting.   Genitourinary: Negative for dysuria and hematuria.   Musculoskeletal: Negative for myalgias.   Skin: Negative for rash.   Neurological: Negative for dizziness, sensory change, focal weakness and headaches.   Psychiatric/Behavioral: Positive for substance abuse.       VITALS:  Pulse: 79  Blood Pressure: 107/72       Temp  Av.6 °C (97.8 °F)  Min: 36 °C (96.8 °F)  Max: 37.2 °C (98.9 °F)         Physical Exam:  Physical Exam   Constitutional: He is oriented to person, place, and time and well-developed, well-nourished, and in no distress. No distress.   HENT:   Head: Normocephalic.   Mouth/Throat: Oropharynx is clear and moist.   Eyes: Pupils are equal, round, and reactive to light. No scleral icterus.   Neck: Normal range of motion. No JVD present.   Cardiovascular: Normal rate and regular rhythm.   Murmur heard.  Pulmonary/Chest: Effort normal. No respiratory distress. He has rales.   Abdominal: Soft. Bowel sounds are normal. He exhibits no distension.   Musculoskeletal: Normal range of motion.         General: Edema present. No deformity.   Neurological: He is alert and oriented to person, place, and time. No cranial nerve deficit. GCS score is 15.   Skin: Skin is warm. No erythema.   Psychiatric: Mood, affect and judgment normal.          HemoDynamics:  Pulse: 79 Blood Pressure: 107/72      Respiratory:     Respiration: 19, Pulse Oximetry: 98 %    Chest Tube Drains:        Neuro:    Fluids:  Intake/Output       20 07 - 20 0659 (Not Admitted) 20 07 - 20 0659 20 07 - 11/10/20 0659      [id number] 3601-8959 Total 9415-1758  6211-1952 Total 5434-7282 9700-2845 Total       Intake    I.V.  --  -- --  --  500 500  --  -- --    Volume (mL) (NS infusion 500 mL) -- -- -- -- 500 500 -- -- --    IV Piggyback  --  -- --  --  100 100  --  -- --    Volume (mL) (cefTRIAXone (ROCEPHIN) 2 g in  mL IVPB) -- -- -- -- 100 100 -- -- --    Total Intake -- -- -- -- 600 600 -- -- --       Output    Urine  --  -- --  --  675 675  1400  -- 1400    Number of Times Voided -- -- -- -- 2 x 2 x -- -- --    Urine Void (mL) -- -- -- --  -- 1400    Total Output -- -- -- --  -- 1400       Net I/O     -- -- -- -- -75 -75 -1400 -- -1400        Weight: 86.4 kg (190 lb 7.6 oz)  Recent Labs     20  1430   SODIUM 128* 130*   POTASSIUM 4.0 3.2*   CHLORIDE 90* 92*   CO2 21 23   BUN 50* 35*   CREATININE 1.75* 1.27   CALCIUM 9.1 8.4*     Body mass index is 29.83 kg/m².    GI/Nutrition:  Recent Labs     20  1430   ALTSGPT 847*  --  781*   ASTSGOT 685*  --  583*   ALKPHOSPHAT 209*  --  176*   TBILIRUBIN 2.8*  --  1.9*   LIPASE  --  72  --    GLUCOSE 112*  --  121*       Heme:  Recent Labs     20   RBC 5.61  --    HEMOGLOBIN 16.4  --    HEMATOCRIT 50.6  --    PLATELETCT 290  --    PROTHROMBTM  --  18.9*   APTT  --  33.8   INR  --  1.53*       Infectious Disease:  Temp  Av.6 °C (97.8 °F)  Min: 36 °C (96.8 °F)  Max: 37.2 °C (98.9 °F)  Recent Labs     20  1802 20  1430   WBC 9.1  --    NEUTSPOLYS 61.50  --    LYMPHOCYTES 21.40*  --    MONOCYTES 11.80  --    EOSINOPHILS 3.90  --    BASOPHILS 1.00  --    ASTSGOT 685* 583*   ALTSGPT 847* 781*   ALKPHOSPHAT 209* 176*   TBILIRUBIN 2.8* 1.9*       Medications:  • carvedilol  3.125 mg     • cefTRIAXone (ROCEPHIN) IV  2 g Stopped (20 0624)   • senna-docusate  2 Tab      And   • polyethylene glycol/lytes  1 Packet      And   • magnesium hydroxide  30 mL      And   • bisacodyl  10 mg     • acetaminophen  650  mg     • Pharmacy Consult Request  1 Each      And   • oxyCODONE immediate-release  2.5 mg      And   • oxyCODONE immediate-release  5 mg      And   • morphine injection  2 mg     • nicotine  14 mg      And   • nicotine polacrilex  2 mg     • doxycycline  100 mg Stopped (11/09/20 0654)   • atorvastatin  40 mg     • prasugrel  10 mg     • omeprazole  20 mg     • furosemide  40 mg     • [START ON 11/10/2020] spironolactone  25 mg     • heparin  0-30 Units/kg/hr 20 Units/kg/hr (11/09/20 1522)   • heparin  40 Units/kg          Imaging:  US-ABDOMEN COMPLETE SURVEY   Final Result         1. Echogenic liver, most commonly hepatic steatosis.      2. Patent main portal vein but pulsatile flow.      3. Cholelithiasis. Contracted gallbladder. No pericholecystic fluid.            US-EXTREMITY VENOUS LOWER BILAT   Final Result      EC-ECHOCARDIOGRAM COMPLETE W/ CONT   Final Result      CT-CTA CHEST PULMONARY ARTERY W/ RECONS   Final Result      Left lower lobe consolidation and groundglass greater than right lower lobe groundglass. This is worrisome for atypical infection such as Covid 19. Given some central lucency in the left lower lobe, necrotizing pneumonia, septic embolus and/or malignancy    are possible and follow-up to resolution is advised      Left worse than right heart enlargement with definite right, probable left ventricular apex intraluminal filling defects highly likely to represent thrombus. Echocardiogram is recommended to clarify      No CT evidence of pulmonary thromboembolism      DX-CHEST-PORTABLE (1 VIEW)   Final Result      Mild cardiac silhouette enlargement with some left basilar opacity that could be from consolidation, nodule or atelectasis. Follow-up to resolution is recommended          Assessment and plan    * Biventricular failure (HCC)- (present on admission)  Assessment & Plan  -Likely ischemic with recent MI, methamphetamine use may play a component  -EF showed a LVEF of 10%  -Diuresis with IV  Lasix 40 mg twice daily, started patient on carvedilol 3.125 mg twice daily.  -We will hold off on ACE-I and spironolactone given RED, will add these once GFR starts improving  -Continue heparin infusion for biventricular thrombi  -Cardiology on board, appreciate their recommendations    Peptic ulcer disease- (present on admission)  Assessment & Plan  Patient reports a surgery to repair a gastric ulcer  omeprazole    Methamphetamine abuse (HCC)- (present on admission)  Assessment & Plan  Last used 3 days ago    Elevated TSH- (present on admission)  Assessment & Plan  TSH elevated at 12.7, T4 1.5 and T3 59.3   -Will hold off on starting Synthroid until acute issues are addressed    CAD (coronary artery disease)- (present on admission)  Assessment & Plan  Recent PCI in June at [facility]  Statin, antiplatelet    QT prolongation- (present on admission)  Assessment & Plan  QTc 513 on EKG  Limit QTc prolonging meds    Pneumonia- (present on admission)  Assessment & Plan  -CT PE showed left lower lobe consolidation with central cavitation, groundglass opacities in bilateral lower lung fields (left >right) and right pleural effusion  -Negative for influenza and initial Covid test, repeat Covid test pending  -Started on ceftriaxone and doxycycline (start date 11/8)  -May need a HRCT in the future  -F/U blood cultures and sputum cultures      RED (acute kidney injury) (HCC)- (present on admission)  Assessment & Plan  Likely secondary to cardiorenal vs other.  -Unknown baseline Cr  -Strict I's/O's  -We will hold off on spironolactone, ACEi/ARB's  -Renal dose meds, avoid nephrotoxins  -Renal US showed no signs of obstruction, renal calculi or masses  -Follow renal function      Elevated transaminase level- (present on admission)  Assessment & Plan  Likely secondary to congestive hepatopathy vs thrombus  RUQ US showed hepatic steatosis and Rhina lithiasis with no signs of cholecystitis,  Hepatitis panel negative, normal ammonia  levels  Limit hepatotoxins  Monitor synthetic function    Left ventricular apical thrombus- (present on admission)  Assessment & Plan  Confirmed on echo  Continue heparin infusion  Cardiology on board    Right ventricular apical thrombus- (present on admission)  Assessment & Plan  -Continue heparin infusion  -Lower extremity ultrasound showed no signs of DVT  At high risk for embolization with systemic thrombolysis      Pain in the chest- (present on admission)  Assessment & Plan  Epigastric/lower chest at the time of admission, currently denies pain  -Troponin stable at 38  -RUQ ultrasound shows hepatic steatosis and cholelithiasis without signs of cholecystitis  -Lipase normal  -Echo shows an EF of 10% with thrombi in the right and left ventricles and RVSP of 45 mmHg      DISPO:  ICU    Code Status: Full    Quality Measures:  Feeding: Cardiac diet  Analgesia: Acetaminophen, oxycodone and morphine  Sedation: None  Thromboprophylaxis: On heparin drip  Head of bed: >30 degrees  Ulcer prophylaxis: Omeprazole  Glycemic control: None  Bowel care: bowel regimen  Indwelling lines: 2 Peripheral IVs  Deescalation of antibiotics: Ceftriaxone and doxycycline for CAP

## 2020-11-09 NOTE — ASSESSMENT & PLAN NOTE
Epigastric/lower chest at the time of admission, currently denies pain  Troponin trending down   RUQ ultrasound shows hepatic steatosis and cholelithiasis without signs of cholecystitis  Lipase normal  Echo shows an EF of 10% with thrombi in the right and left ventricles and RVSP of 45 mmHg.   Was started on a heparin drip and transitioned to warfarin

## 2020-11-09 NOTE — ASSESSMENT & PLAN NOTE
LLL on CTPA  CAP coverage  ?necrosis , may need a HRCT in the future  F/U blood cultures and sputum cultures  Influenza and COVID negative

## 2020-11-09 NOTE — PROGRESS NOTES
Pt to ICU in R-111 at approximately 0120. Alert & oriented x 4. Denies any chest pain or SOB. On RA with sats 98%. NSR on monitor, stable BP. Heparin gtt infusing at 18 units/kg/hr. Will continue to monitor.

## 2020-11-09 NOTE — ASSESSMENT & PLAN NOTE
Cellulitis, Adult    Cellulitis is a skin infection. The infected area is usually warm, red, swollen, and tender. This condition occurs most often in the arms and lower legs. The infection can travel to the muscles, blood, and underlying tissue and become serious. It is very important to get treated for this condition.  What are the causes?  Cellulitis is caused by bacteria. The bacteria enter through a break in the skin, such as a cut, burn, insect bite, open sore, or crack.  What increases the risk?  This condition is more likely to occur in people who:  · Have a weak body defense system (immune system).  · Have open wounds on the skin, such as cuts, burns, bites, and scrapes. Bacteria can enter the body through these open wounds.  · Are older than 60 years of age.  · Have diabetes.  · Have a type of long-lasting (chronic) liver disease (cirrhosis) or kidney disease.  · Are obese.  · Have a skin condition such as:  ? Itchy rash (eczema).  ? Slow movement of blood in the veins (venous stasis).  ? Fluid buildup below the skin (edema).  · Have had radiation therapy.  · Use IV drugs.  What are the signs or symptoms?  Symptoms of this condition include:  · Redness, streaking, or spotting on the skin.  · Swollen area of the skin.  · Tenderness or pain when an area of the skin is touched.  · Warm skin.  · A fever.  · Chills.  · Blisters.  How is this diagnosed?  This condition is diagnosed based on a medical history and physical exam. You may also have tests, including:  · Blood tests.  · Imaging tests.  How is this treated?  Treatment for this condition may include:  · Medicines, such as antibiotic medicines or medicines to treat allergies (antihistamines).  · Supportive care, such as rest and application of cold or warm cloths (compresses) to the skin.  · Hospital care, if the condition is severe.  The infection usually starts to get better within 1-2 days of treatment.  Follow these instructions at  Last used 3 days ago  Counseling when appropriate   home:    Medicines  · Take over-the-counter and prescription medicines only as told by your health care provider.  · If you were prescribed an antibiotic medicine, take it as told by your health care provider. Do not stop taking the antibiotic even if you start to feel better.  General instructions  · Drink enough fluid to keep your urine pale yellow.  · Do not touch or rub the infected area.  · Raise (elevate) the infected area above the level of your heart while you are sitting or lying down.  · Apply warm or cold compresses to the affected area as told by your health care provider.  · Keep all follow-up visits as told by your health care provider. This is important. These visits let your health care provider make sure a more serious infection is not developing.  Contact a health care provider if:  · You have a fever.  · Your symptoms do not begin to improve within 1-2 days of starting treatment.  · Your bone or joint underneath the infected area becomes painful after the skin has healed.  · Your infection returns in the same area or another area.  · You notice a swollen bump in the infected area.  · You develop new symptoms.  · You have a general ill feeling (malaise) with muscle aches and pains.  Get help right away if:  · Your symptoms get worse.  · You feel very sleepy.  · You develop vomiting or diarrhea that persists.  · You notice red streaks coming from the infected area.  · Your red area gets larger or turns dark in color.  These symptoms may represent a serious problem that is an emergency. Do not wait to see if the symptoms will go away. Get medical help right away. Call your local emergency services (911 in the [place] Do not drive yourself to the hospital.  Summary  · Cellulitis is a skin infection. This condition occurs most often in the arms and lower legs.  · Treatment for this condition may include medicines, such as antibiotic medicines or antihistamines.  · Take over-the-counter and prescription  medicines only as told by your health care provider. If you were prescribed an antibiotic medicine, do not stop taking the antibiotic even if you start to feel better.  · Contact a health care provider if your symptoms do not begin to improve within 1-2 days of starting treatment or your symptoms get worse.  · Keep all follow-up visits as told by your health care provider. This is important. These visits let your health care provider make sure that a more serious infection is not developing.  This information is not intended to replace advice given to you by your health care provider. Make sure you discuss any questions you have with your health care provider.  Document Released: 09/27/2006 Document Revised: 05/09/2019 Document Reviewed: 05/09/2019  [employer] Patient Education © 2020 [employer]

## 2020-11-09 NOTE — ED PROVIDER NOTES
ED Provider Note    ED Provider Note    Scribed for Cortez Delgado MD by Cortez Delgado M.D.. 11/8/2020, 8:30 PM.    Primary care provider: Pcp Pt States None  Means of arrival: Private  History obtained from: Patient  History limited by: None    CHIEF COMPLAINT  Chief Complaint   Patient presents with   • Epigastric Pain       HPI  Matt Ray is a 50 y.o. male who presents to the Emergency Department for evaluation of dyspnea and epigastric discomfort.  Patient notes this occurred after eating it today about 7 hours prior to arrival.  He does endorse a history of heart disease, describing remote coronary artery stenting.  He also has a history of peptic ulcer disease.  Patient relates no nausea, no vomiting, no diarrhea.  The pain is twisting crampy and localized the epigastrium without radiation elsewhere.  He has no chest pain, though he does endorse dyspnea and a nonproductive cough.  Additionally, patient notes the pain seems to wax and wane in severity, currently mild at this time.  He also notes peripheral edema, more so than his baseline.  Patient relates he was diagnosed with silica pulmonary embolism approximately 6 weeks ago at an outlying hospital, he is not sure if he is on anticoagulation chart review does demonstrate he is on Effient for antiplatelet therapy for his coronary artery disease.  He also notes follows up with [facility] in fact has appointment to see his cardiologist tomorrow.  Denies any chest pain, denies fever.    REVIEW OF SYSTEMS  Pertinent positives include epigastric abdominal pain, dyspnea, nonproductive cough, peripheral edema. Pertinent negatives include no fever, no vomiting, no precordial chest pain.  All other systems reviewed and negative.    PAST MEDICAL HISTORY   has a past medical history of appendectomy.    SURGICAL HISTORY   has a past surgical history that includes appendectomy.    SOCIAL HISTORY  Social History     Tobacco Use   •  "Smoking status: Current Every Day Smoker     Packs/day: 0.25     Types: Cigarettes   Substance Use Topics   • Alcohol use: Yes     Comment: daily social drinker   • Drug use: No      Social History     Substance and Sexual Activity   Drug Use No       FAMILY HISTORY  History reviewed. No pertinent family history.    CURRENT MEDICATIONS  Home Medications     Reviewed by Gill Richardson on 11/08/20 at 2245  Med List Status: Complete   Medication Last Dose Status   atorvastatin (LIPITOR) 20 MG Tab 11/7/2020 Active   furosemide (LASIX) 20 MG Tab 11/8/2020 Active   prasugrel (EFFIENT) 10 MG Tab 11/8/2020 Active   spironolactone (ALDACTONE) 25 MG Tab 11/8/2020 Active                ALLERGIES  No Known Allergies    PHYSICAL EXAM  VITAL SIGNS: BP (!) 97/65   Pulse 100   Temp 37.2 °C (98.9 °F) (Oral)   Resp (!) 27   Ht 1.702 m (5' 7\")   Wt 81.6 kg (180 lb)   SpO2 98%   BMI 28.19 kg/m²     General: Alert, no acute distress  Skin: Warm, dry, mildly pale  Head: Normocephalic, atraumatic  Neck: Trachea midline, no tenderness  Eye: PERRL, normal conjunctiva, extraocular movements intact.  ENMT: Oral mucosa moist, no pharyngeal erythema or exudate  Cardiovascular: Regular rate and rhythm, No murmur, Normal peripheral perfusion.  2+ symmetrical pretibial edema noted.  Respiratory: Lungs CTA, respirations are non-labored, breath sounds are equal  Gastrointestinal: Isolated epigastric tenderness, negative Dangelo sign, no guarding, no rebound, no rigidity.  Bowel sounds are mildly hypoactive.  Musculoskeletal:  no deformity  Neurological: Alert and oriented to person, place, time, and situation  Lymphatics: No lymphadenopathy  Psychiatric: Cooperative, appropriate mood & affect      DIAGNOSTIC STUDIES/PROCEDURES    LABS  Results for orders placed or performed during the hospital encounter of 11/08/20   EC-ECHOCARDIOGRAM COMPLETE W/ CONT   Result Value Ref Range    Left Ventrical Ejection Fraction 10    CBC with Differential "   Result Value Ref Range    WBC 9.1 4.8 - 10.8 K/uL    RBC 5.61 4.70 - 6.10 M/uL    Hemoglobin 16.4 14.0 - 18.0 g/dL    Hematocrit 50.6 42.0 - 52.0 %    MCV 90.2 81.4 - 97.8 fL    MCH 29.2 27.0 - 33.0 pg    MCHC 32.4 (L) 33.7 - 35.3 g/dL    RDW 46.4 35.9 - 50.0 fL    Platelet Count 290 164 - 446 K/uL    MPV 11.9 9.0 - 12.9 fL    Neutrophils-Polys 61.50 44.00 - 72.00 %    Lymphocytes 21.40 (L) 22.00 - 41.00 %    Monocytes 11.80 0.00 - 13.40 %    Eosinophils 3.90 0.00 - 6.90 %    Basophils 1.00 0.00 - 1.80 %    Immature Granulocytes 0.40 0.00 - 0.90 %    Nucleated RBC 0.00 /100 WBC    Neutrophils (Absolute) 5.58 1.82 - 7.42 K/uL    Lymphs (Absolute) 1.94 1.00 - 4.80 K/uL    Monos (Absolute) 1.07 (H) 0.00 - 0.85 K/uL    Eos (Absolute) 0.35 0.00 - 0.51 K/uL    Baso (Absolute) 0.09 0.00 - 0.12 K/uL    Immature Granulocytes (abs) 0.04 0.00 - 0.11 K/uL    NRBC (Absolute) 0.00 K/uL   Complete Metabolic Panel (CMP)   Result Value Ref Range    Sodium 128 (L) 135 - 145 mmol/L    Potassium 4.0 3.6 - 5.5 mmol/L    Chloride 90 (L) 96 - 112 mmol/L    Co2 21 20 - 33 mmol/L    Anion Gap 17.0 (H) 7.0 - 16.0    Glucose 112 (H) 65 - 99 mg/dL    Bun 50 (H) 8 - 22 mg/dL    Creatinine 1.75 (H) 0.50 - 1.40 mg/dL    Calcium 9.1 8.5 - 10.5 mg/dL    AST(SGOT) 685 (H) 12 - 45 U/L    ALT(SGPT) 847 (H) 2 - 50 U/L    Alkaline Phosphatase 209 (H) 30 - 99 U/L    Total Bilirubin 2.8 (H) 0.1 - 1.5 mg/dL    Albumin 3.5 3.2 - 4.9 g/dL    Total Protein 7.0 6.0 - 8.2 g/dL    Globulin 3.5 1.9 - 3.5 g/dL    A-G Ratio 1.0 g/dL   Troponin   Result Value Ref Range    Troponin T 44 (H) 6 - 19 ng/L   ESTIMATED GFR   Result Value Ref Range    GFR If African American 50 (A) >60 mL/min/1.73 m 2    GFR If Non  41 (A) >60 mL/min/1.73 m 2   TROPONIN   Result Value Ref Range    Troponin T 39 (H) 6 - 19 ng/L   Lipase   Result Value Ref Range    Lipase 72 11 - 82 U/L   LACTIC ACID   Result Value Ref Range    Lactic Acid 2.9 (H) 0.5 - 2.0 mmol/L   TSH  (for screening thyroid dysfunction)   Result Value Ref Range    TSH 12.700 (H) 0.380 - 5.330 uIU/mL   APTT   Result Value Ref Range    APTT 33.8 24.7 - 36.0 sec   PT/INR   Result Value Ref Range    PT 18.9 (H) 12.0 - 14.6 sec    INR 1.53 (H) 0.87 - 1.13   proBrain Natriuretic Peptide, NT   Result Value Ref Range    NT-proBNP 6863 (H) 0 - 125 pg/mL   COVID/SARS CoV-2 PCR    Specimen: Nasopharyngeal; Respirate   Result Value Ref Range    COVID Order Status Received    CoV-2, Flu A/B, And RSV by PCR   Result Value Ref Range    Influenza virus A RNA Negative Negative    Influenza virus B, PCR Negative Negative    RSV, PCR Negative Negative    SARS-CoV-2 by PCR NotDetected     SARS-CoV-2 Source NP Swab    EKG (NOW)   Result Value Ref Range    Report       [facility] Emergency Dept.    Test Date:  2020  Pt Name:    CATRACHITO WOODS                Department: ER  MRN:        [id number]                      Room:  Gender:     Male                         Technician: [id number]  :        1970                   Requested By:ER TRIAGE PROTOCOL  Order #:    [id number]                    Reading MD:    Measurements  Intervals                                Axis  Rate:       111                          P:          74  NY:         136                          QRS:        -89  QRSD:       120                          T:          72  QT:         364  QTc:        495    Interpretive Statements  SINUS TACHYCARDIA  PROBABLE LEFT ATRIAL ABNORMALITY  LEFT ANTERIOR FASCICULAR BLOCK  LEFT VENTRICULAR HYPERTROPHY  ANTERIOR INFARCT, AGE INDETERMINATE  Compared to ECG 2014 22:56:19  Left anterior fascicular block now present  Left ventricular hypertrophy now present  Myocardial infarct finding now present  Sinus rhythm no longer  present     EKG   Result Value Ref Range    Report       [facility] Emergency Dept.    Test Date:  2020  Pt Name:    CATRACHITO WOODS                Department:  ER  MRN:        [id number]                      Room:  Gender:     Male                         Technician: [id number]  :        1970                   Requested By:ER TRIAGE PROTOCOL  Order #:    [id number]                    Reading MD:    Measurements  Intervals                                Axis  Rate:       106                          P:          79  AK:         144                          QRS:        -80  QRSD:       128                          T:          83  QT:         388  QTc:        516    Interpretive Statements  SINUS TACHYCARDIA  LEFT ATRIAL ABNORMALITY  NONSPECIFIC IVCD WITH LAD  LEFT VENTRICULAR HYPERTROPHY  ANTERIOR Q WAVES, POSSIBLY DUE TO LVH  Compared to ECG 2020 17:18:52  Intraventricular conduction delay now present  Q waves now present  Left anterior fascicular block no longer present  Myocardial infarct finding no longer prese nt       All labs reviewed by me, hepatic dysfunction with mildly elevated bilirubin and abnormal transaminases, acute kidney injury noted as well.    EKG  12 Lead EKG obtained at 2100 and interpreted by me to show:  Rhythm: Sinus tachycardia  Rate: 106  Axis: Normal  Intervals: Normal  Q Waves: Normal  No diagnostic ST segment elevation  Arteria of LVH  Clinical Impression: Normal EKG  Compared to 2020, no significant change    RADIOLOGY  EC-ECHOCARDIOGRAM COMPLETE W/ CONT   Final Result      CT-CTA CHEST PULMONARY ARTERY W/ RECONS   Final Result      Left lower lobe consolidation and groundglass greater than right lower lobe groundglass. This is worrisome for atypical infection such as Covid 19. Given some central lucency in the left lower lobe, necrotizing pneumonia, septic embolus and/or malignancy    are possible and follow-up to resolution is advised      Left worse than right heart enlargement with definite right, probable left ventricular apex intraluminal filling defects highly likely to represent thrombus. Echocardiogram is recommended  to clarify      No CT evidence of pulmonary thromboembolism      DX-CHEST-PORTABLE (1 VIEW)   Final Result      Mild cardiac silhouette enlargement with some left basilar opacity that could be from consolidation, nodule or atelectasis. Follow-up to resolution is recommended        The radiologist's interpretation of all radiological studies have been reviewed by me.    COURSE & MEDICAL DECISION MAKING  Pertinent Labs & Imaging studies reviewed. (See chart for details)    8:30 PM - Patient seen and examined at bedside. Patient will be treated with famotidine 20 mg IV. Ordered cardiac work-up as well as CTA of the chest to evaluate his symptoms. The differential diagnoses include but are not limited to: Pulmonary embolism, acute MI, peptic ulcer disease, pneumonia    2140: Patient has been transiently hypotensive and remains tachycardic.  His lactate is also rather high, no evidence of this point suggestive of infectious source/sepsis, in addition he has hyponatremia and hypochloremia and as such I have ordered a cautious fluid bolus given hypotension and acute kidney injury, 500 cc of crystalloid.  Patient does have edema and BNP is still pending but chest x-ray demonstrates no evidence of pulmonary edema or pleural effusion at this time.    2216: Patient reassessed, feeling well, blood pressures improved with IV fluids, currently 101/78.  He is not hypoxic.  Updated with concerning findings though thankfully no evidence of pulmonary embolism.    Given concern for ACS and elevated troponin aspirin is ordered at this time, 324 mg p.o.  No indication at this point for anticoagulation, CT demonstrates no pulmonary embolism though there is concern for potential intramural ventricular thrombus, echocardiogram recommended by radiology.  COVID-19 study is negative, given evidence of consolidation I suspect likely bacterial etiology in that case, Rocephin and azithromycin ordered at this time, blood cultures sent prior to  "antibiotic administration. We will page the triage officer for admission.  Have ordered stat echocardiogram given concern for thrombus on CT.    2231: I spoke with the hospitalist Dr. Winters, he declines admission noting given the thrombus and multisystem organ dysfunction ICU admission is warranted, as such I paged the intensivist for admission.      2257: I have heard back from the intensivist who does not feel the patient meets ICU criteria.  He will speak with the hospitalist to determine which of them will admit the patient.  In the meantime have paged cardiology for consultation and necessity of stat echocardiogram.    2301: I spoke with Dr. Palomares the cardiologist, he concurs no anticoagulation at this point until diagnosis is more clear, concurs with plan for stat echocardiogram.    2332: I spoke with the cardiologist is reviewed the stat echocardiogram, the patient actually has been evidence of biventricular failure with an EF 10% and thrombi in both ventricles.  Recommends heparin bolus and drip and will consult on the case.    2345: I spoke again with the intensivist, he would also like cardiothoracic surgery consultation.  We will page the on-call CT surgeon.    0007: Spoke with on-call CT surgery, they concur no indication for emergent operative intervention this evening but will consult on the case and see the patient in the morning.    Patient Vitals for the past 24 hrs:   BP Temp Temp src Pulse Resp SpO2 Height Weight   11/08/20 2346 107/80 -- -- 95 20 99 % -- --   11/08/20 2316 100/77 -- -- 99 17 97 % -- --   11/08/20 2216 100/76 -- -- (!) 102 (!) 26 98 % -- --   11/08/20 2200 104/80 -- -- (!) 101 (!) 22 97 % -- --   11/08/20 2131 101/78 -- -- (!) 102 (!) 26 -- -- --   11/08/20 1945 104/77 37.2 °C (98.9 °F) Oral (!) 107 16 98 % -- --   11/08/20 1802 -- -- -- -- -- -- 1.702 m (5' 7\") 81.6 kg (180 lb)   11/08/20 1751 (!) 97/73 -- -- (!) 109 -- -- -- --   11/08/20 1709 -- 36 °C (96.8 °F) Temporal (!) 110 " "16 96 % 1.702 m (5' 7\") --       HYDRATION: Based on the patient's presentation of Hypotension the patient was given IV fluids. IV Hydration was used because oral hydration failed due to NPO. Upon recheck following hydration, the patient was Doing better, tachycardia resolved, heart rate currently 95, blood pressure also improved, 107/80.    Patient is critically ill.   The patient continues to have: Hypotension and tachycardia  The vital organ system that is affected is the: Cardiovascular  If untreated there is a high chance of deterioration into: Heart failure  And eventually death.   The critical care that I am providing today is: Multiple specialist consultation, heparin bolus and drip  The critical that has been undertaken is medically complex.   There has been no overlap in critical care time.   Critical Care Time not including procedures: 60    Decision Making:      This is a 50 y.o. year old male who presents with dyspnea and epigastric discomfort.  Has no precordial chest pain but does report a history of heart disease. .  He has multiple abnormalities noted on his chemistries including hyponatremia and hypochloremia as well as evidence of hepatic insufficiency and acute kidney injury.  Troponin is also mildly elevated, there is no ST changes thankfully on the EKG and he has no precordial chest pain.  Certainly concerning presentation with multiple risk factors necessitate full cardiac work-up as well as CT of the chest given history of pulmonary embolism and his acute dyspnea.  X-ray of the chest demonstrates what could be consolidation, thankfully has no fever, no leukocytosis, no evidence of septicemia the lactate is moderately elevated, likely secondary to poor perfusion in this context.  Troponin is out of range high, given his established heart disease concern for potential angina today and HEART score of 5, moderate risk of patient, I do feel inpatient management is warranted.  In addition, there is " concern for potential cardiac thrombus, echocardiogram is recommended by radiology.  In addition patient has fairly acute kidney injury and impressively abnormal transaminases and is mildly coagulopathic as well, INR is elevated to 1.53.  Does appear to be edematous and is elevated BNP all consistent with acute CHF patient notes no previous diagnosis of this condition.  Imaging demonstrates no pulmonary embolism however there is concern for pneumonia.      Essentially this patient has a likely ventricular thrombus and multisystem organ dysfunction, spoke with the triage officer who concurs with ICU placement.  Spoke with the intensivist who will consult on the case, I also spoke with the cardiologist who noted biventricular failure with an EF approximately 10% as well as biventricular thrombi, for this he recommends heparin, bolus and drip will be initiated here in the ED.    Patient is admitted to the ICU in improved but guarded condition    FINAL IMPRESSION  1. Abdominal pain, acute, epigastric    2. Acute dyspnea    3. History of coronary artery disease    4. Hepatic insufficiency (HCC)    5. Acute kidney injury (HCC)    6. Peripheral edema    7. Hypotension, unspecified hypotension type    8. Elevated troponin    9. Pneumonia of left lower lobe due to infectious organism    10. Left ventricular thrombosis    11. Right ventricular thrombus    12. Cardiomyopathy, unspecified type (HCC)          Cortez SANCHEZ M.D. (Scrdariuse), am scribing for, and in the presence of, Cortez Delgado MD.    Electronically signed by: Cortez Delgado M.D. (Scribe), 11/8/2020    Cortez SANCHEZ MD personally performed the services described in this documentation, as scribed by Cortez Delgado M.D. in my presence, and it is both accurate and complete    The note accurately reflects work and decisions made by me.  Cortez Delgado M.D.  11/9/2020  12:08 AM

## 2020-11-09 NOTE — CONSULTS
Cardiology Consult Note:    Jovita Gooden M.D.  Date & Time note created:    11/9/2020   7:01 AM     Referring MD:  Dr. Delgado    Patient ID:   Name:             Matt Ray   YOB: 1970  Age:                 50 y.o.  male   MRN:               [id number]                                                             Chief Complaint / Reason for consult:  CHF exacerbation    History of Present Illness:    This is a 50 years old man with no prior cardiac history, presented to the hospital with worsening shortness of breath and heart failure symptoms.  Patient was eventually diagnosed with biventricular failure along with biventricular apical thrombus in both his left and right ventricle.  I personally interpreted images of his transthoracic echocardiogram.  Patient also had a CT scan finding of the lungs suspicious for COVID-19.  Of note, his rapid Covid test is negative and we are still waiting on the second test for confirmation to come back.  There is no evidence of pulmonary embolism on his CT scan.  He is positive for methamphetamine in his urine tox screening however.    Of note, his left ventricular systolic function was found to be 10% and there is also reduction in right ventricular systolic function.  There is evidence of moderate mitral regurgitation as well.    Patient is a poor historian, most information obtained through chart review, discussion with ER physician and nursing staff.    Review of Systems:      Limited review of systems due to Covid precaution at this time.  Patient did have a report of progressively worsening shortness of breath along with lower extremity edema.              Past Medical History:   Past Medical History:   Diagnosis Date   • HFrEF (heart failure with reduced ejection fraction) ([facility]) 11/9/2020   • Hx of appendectomy    • Left ventricular systolic dysfunction, NYHA class 3 11/9/2020     Active Hospital Problems    Diagnosis   • Biventricular failure (HCC)  [I50.82]   • Pain in the chest [R07.9]   • Right ventricular apical thrombus [I51.3]   • Left ventricular apical thrombus [I51.3]   • Elevated transaminase level [R74.01]   • RED (acute kidney injury) ([facility]) [N17.9]   • Pneumonia [J18.9]   • QT prolongation [R94.31]   • CAD (coronary artery disease) [I25.10]   • Elevated TSH [R79.89]   • Methamphetamine abuse ([facility]) [F15.10]   • Peptic ulcer disease [K27.9]   • HFrEF (heart failure with reduced ejection fraction) ([facility]) [I50.20]   • Left ventricular systolic dysfunction, NYHA class 3 [I51.9]       Past Surgical History:  Past Surgical History:   Procedure Laterality Date   • APPENDECTOMY         Hospital Medications:    Current Facility-Administered Medications:   •  carvedilol (COREG) tablet 3.125 mg, 3.125 mg, Oral, BID WITH MEALS, Jovita Gooden M.D., Stopped at 11/09/20 0030  •  cefTRIAXone (ROCEPHIN) 2 g in  mL IVPB, 2 g, Intravenous, Q24HRS, Vadim Edge Jr., D.O., Stopped at 11/09/20 0624  •  senna-docusate (PERICOLACE or SENOKOT S) 8.6-50 MG per tablet 2 Tab, 2 Tab, Oral, BID, 2 Tab at 11/09/20 0551 **AND** polyethylene glycol/lytes (MIRALAX) PACKET 1 Packet, 1 Packet, Oral, QDAY PRN **AND** magnesium hydroxide (MILK OF MAGNESIA) suspension 30 mL, 30 mL, Oral, QDAY PRN **AND** bisacodyl (DULCOLAX) suppository 10 mg, 10 mg, Rectal, QDAY PRN, Vadim Edge Jr., D.O.  •  acetaminophen (Tylenol) tablet 650 mg, 650 mg, Oral, Q6HRS PRN, Vadim Edge Jr., D.O.  •  Notify provider if pain remains uncontrolled, , , CONTINUOUS **AND** Use the numeric rating scale (NRS-11) on regular floors and Critical-Care Pain Observation Tool (CPOT) on ICUs/Trauma to assess pain, , , CONTINUOUS **AND** Pulse Ox (Oximetry), , , CONTINUOUS **AND** Pharmacy Consult Request ...Pain Management Review 1 Each, 1 Each, Other, PHARMACY TO DOSE **AND** If patient difficult to arouse and/or has respiratory depression, stop any opiates that are currently infusing and call a  Rapid Response., , , CONTINUOUS **AND** oxyCODONE immediate-release (ROXICODONE) tablet 2.5 mg, 2.5 mg, Oral, Q3HRS PRN **AND** oxyCODONE immediate-release (ROXICODONE) tablet 5 mg, 5 mg, Oral, Q3HRS PRN **AND** morphine (pf) 4 mg/mL injection 2 mg, 2 mg, Intravenous, Q3HRS PRN, Vadim Edge Jr., D.O.  •  nicotine (NICODERM) 14 MG/24HR 14 mg, 14 mg, Transdermal, Daily-0600, 14 mg at 11/09/20 0553 **AND** Nicotine Replacement Patient Education Materials, , , Once **AND** nicotine polacrilex (NICORETTE) 2 MG piece 2 mg, 2 mg, Oral, Q HOUR PRN, Vadim Edge Jr., D.O.  •  doxycycline (VIBRAMYCIN) 100 mg in  mL IVPB, 100 mg, Intravenous, Q12HRS, Vadim Edge Jr. D.O., Last Rate: 100 mL/hr at 11/09/20 0554, 100 mg at 11/09/20 0554  •  spironolactone (ALDACTONE) tablet 25 mg, 25 mg, Oral, DAILY, Vadim Edge Jr. D.O., 25 mg at 11/09/20 0553  •  atorvastatin (LIPITOR) tablet 40 mg, 40 mg, Oral, Nightly, Vadim Edge Jr. D.O., 40 mg at 11/09/20 0205  •  prasugrel (EFFIENT) tablet 10 mg, 10 mg, Oral, DAILY, Vadim Edge Jr. D.O., 10 mg at 11/09/20 0553  •  omeprazole (PRILOSEC) capsule 20 mg, 20 mg, Oral, DAILY, Vdaim Edge Jr. D.O., 20 mg at 11/09/20 0553  •  furosemide (LASIX) injection 40 mg, 40 mg, Intravenous, BID DIURETIC, Jovita Gooden M.D.  •  heparin infusion 25,000 units in 500 mL 0.45% NACL, 0-30 Units/kg/hr, Intravenous, Continuous, Vadim Edge Jr. D.O., Last Rate: 29.4 mL/hr at 11/08/20 2352, 18 Units/kg/hr at 11/08/20 2352  •  heparin injection 3,300 Units, 40 Units/kg, Intravenous, PRN, Vadim Edge Jr., D.O.    Current Outpatient Medications:  Medications Prior to Admission   Medication Sig Dispense Refill Last Dose   • spironolactone (ALDACTONE) 25 MG Tab Take 25 mg by mouth every day.   11/8/2020 at 0730   • atorvastatin (LIPITOR) 20 MG Tab Take 40 mg by mouth every evening.   11/7/2020 at 1930   • prasugrel (EFFIENT) 10 MG Tab Take 10 mg by mouth every day.    "11/8/2020 at 0730   • furosemide (LASIX) 20 MG Tab Take 20 mg by mouth every day.   11/8/2020 at 0730       Medication Allergy:  No Known Allergies    Family History:  History reviewed. No pertinent family history.    Social History:  Social History     Socioeconomic History   • Marital status: Single     Spouse name: Not on file   • Number of children: Not on file   • Years of education: Not on file   • Highest education level: Not on file   Occupational History   • Not on file   Social Needs   • Financial resource strain: Not on file   • Food insecurity     Worry: Not on file     Inability: Not on file   • Transportation needs     Medical: Not on file     Non-medical: Not on file   Tobacco Use   • Smoking status: Current Every Day Smoker     Packs/day: 0.25     Types: Cigarettes   Substance and Sexual Activity   • Alcohol use: Yes     Comment: daily social drinker   • Drug use: Yes     Comment: methamphetamine   • Sexual activity: Not on file   Lifestyle   • Physical activity     Days per week: Not on file     Minutes per session: Not on file   • Stress: Not on file   Relationships   • Social connections     Talks on phone: Not on file     Gets together: Not on file     Attends Taoist service: Not on file     Active member of club or organization: Not on file     Attends meetings of clubs or organizations: Not on file     Relationship status: Not on file   • Intimate partner violence     Fear of current or ex partner: Not on file     Emotionally abused: Not on file     Physically abused: Not on file     Forced sexual activity: Not on file   Other Topics Concern   • Not on file   Social History Narrative   • Not on file         Physical Exam:  Vitals/ General Appearance:   Weight/BMI: Body mass index is 29.83 kg/m².  /66   Pulse 89   Temp 36.6 °C (97.9 °F) (Temporal)   Resp 19   Ht 1.702 m (5' 7\")   Wt 86.4 kg (190 lb 7.6 oz)   SpO2 98%   Vitals:    11/09/20 0300 11/09/20 0400 11/09/20 0500 11/09/20 " 0600   BP: 106/79 105/79 109/71 100/66   Pulse: 93 97 93 89   Resp: (!) 22 (!) 25 (!) 21 19   Temp:  36.7 °C (98 °F)  36.6 °C (97.9 °F)   TempSrc:  Temporal  Temporal   SpO2: 99% 98% 96% 98%   Weight:       Height:         Oxygen Therapy:  Pulse Oximetry: 98 %, O2 Delivery Device: None - Room Air    Due to Covid precaution, physical exam is limited.  Constitutional:   no acute distress  Heart: +pitting edema in BLE.  Lungs: no breathing distress, not tachypneic  Abdomen: no distention  Neurology: no signs of focal deficits.  Mentation is alert.        MDM (Data Review):     Records reviewed and summarized in current documentation    Lab Data Review:  Recent Results (from the past 24 hour(s))   EKG (NOW)    Collection Time: 20  5:18 PM   Result Value Ref Range    Report       [facility] Emergency Dept.    Test Date:  2020  Pt Name:    CATRACHITO WOODS                Department: ER  MRN:        [id number]                      Room:  Gender:     Male                         Technician: [id number]  :        1970                   Requested By:ER TRIAGE PROTOCOL  Order #:    [id number]                    Reading MD:    Measurements  Intervals                                Axis  Rate:       111                          P:          74  IN:         136                          QRS:        -89  QRSD:       120                          T:          72  QT:         364  QTc:        495    Interpretive Statements  SINUS TACHYCARDIA  PROBABLE LEFT ATRIAL ABNORMALITY  LEFT ANTERIOR FASCICULAR BLOCK  LEFT VENTRICULAR HYPERTROPHY  ANTERIOR INFARCT, AGE INDETERMINATE  Compared to ECG 2014 22:56:19  Left anterior fascicular block now present  Left ventricular hypertrophy now present  Myocardial infarct finding now present  Sinus rhythm no longer  present     CBC with Differential    Collection Time: 20  6:02 PM   Result Value Ref Range    WBC 9.1 4.8 - 10.8 K/uL    RBC 5.61 4.70 - 6.10 M/uL     Hemoglobin 16.4 14.0 - 18.0 g/dL    Hematocrit 50.6 42.0 - 52.0 %    MCV 90.2 81.4 - 97.8 fL    MCH 29.2 27.0 - 33.0 pg    MCHC 32.4 (L) 33.7 - 35.3 g/dL    RDW 46.4 35.9 - 50.0 fL    Platelet Count 290 164 - 446 K/uL    MPV 11.9 9.0 - 12.9 fL    Neutrophils-Polys 61.50 44.00 - 72.00 %    Lymphocytes 21.40 (L) 22.00 - 41.00 %    Monocytes 11.80 0.00 - 13.40 %    Eosinophils 3.90 0.00 - 6.90 %    Basophils 1.00 0.00 - 1.80 %    Immature Granulocytes 0.40 0.00 - 0.90 %    Nucleated RBC 0.00 /100 WBC    Neutrophils (Absolute) 5.58 1.82 - 7.42 K/uL    Lymphs (Absolute) 1.94 1.00 - 4.80 K/uL    Monos (Absolute) 1.07 (H) 0.00 - 0.85 K/uL    Eos (Absolute) 0.35 0.00 - 0.51 K/uL    Baso (Absolute) 0.09 0.00 - 0.12 K/uL    Immature Granulocytes (abs) 0.04 0.00 - 0.11 K/uL    NRBC (Absolute) 0.00 K/uL   Complete Metabolic Panel (CMP)    Collection Time: 11/08/20  6:02 PM   Result Value Ref Range    Sodium 128 (L) 135 - 145 mmol/L    Potassium 4.0 3.6 - 5.5 mmol/L    Chloride 90 (L) 96 - 112 mmol/L    Co2 21 20 - 33 mmol/L    Anion Gap 17.0 (H) 7.0 - 16.0    Glucose 112 (H) 65 - 99 mg/dL    Bun 50 (H) 8 - 22 mg/dL    Creatinine 1.75 (H) 0.50 - 1.40 mg/dL    Calcium 9.1 8.5 - 10.5 mg/dL    AST(SGOT) 685 (H) 12 - 45 U/L    ALT(SGPT) 847 (H) 2 - 50 U/L    Alkaline Phosphatase 209 (H) 30 - 99 U/L    Total Bilirubin 2.8 (H) 0.1 - 1.5 mg/dL    Albumin 3.5 3.2 - 4.9 g/dL    Total Protein 7.0 6.0 - 8.2 g/dL    Globulin 3.5 1.9 - 3.5 g/dL    A-G Ratio 1.0 g/dL   Troponin    Collection Time: 11/08/20  6:02 PM   Result Value Ref Range    Troponin T 44 (H) 6 - 19 ng/L   ESTIMATED GFR    Collection Time: 11/08/20  6:02 PM   Result Value Ref Range    GFR If African American 50 (A) >60 mL/min/1.73 m 2    GFR If Non  41 (A) >60 mL/min/1.73 m 2   TSH (for screening thyroid dysfunction)    Collection Time: 11/08/20  6:02 PM   Result Value Ref Range    TSH 12.700 (H) 0.380 - 5.330 uIU/mL   HEPATITIS PANEL ACUTE(4 COMPONENTS)     Collection Time: 20  6:02 PM   Result Value Ref Range    Hepatitis B Surface Antigen Non-Reactive Non-Reactive    Hepatitis B Cors Ab,IgM Non-Reactive Non-Reactive    Hepatitis A Virus Ab, IgM Non-Reactive Non-Reactive    Hepatitis C Antibody Non-Reactive Non-Reactive   TROPONIN    Collection Time: 20  7:49 PM   Result Value Ref Range    Troponin T 39 (H) 6 - 19 ng/L   EKG    Collection Time: 20  7:52 PM   Result Value Ref Range    Report       [facility] Emergency Dept.    Test Date:  2020  Pt Name:    CATRACHITO WOODS                Department: ER  MRN:        [id number]                      Room:  Gender:     Male                         Technician: [id number]  :        1970                   Requested By:ER TRIAGE PROTOCOL  Order #:    [id number]                    Reading MD:    Measurements  Intervals                                Axis  Rate:       106                          P:          79  ND:         144                          QRS:        -80  QRSD:       128                          T:          83  QT:         388  QTc:        516    Interpretive Statements  SINUS TACHYCARDIA  LEFT ATRIAL ABNORMALITY  NONSPECIFIC IVCD WITH LAD  LEFT VENTRICULAR HYPERTROPHY  ANTERIOR Q WAVES, POSSIBLY DUE TO LVH  Compared to ECG 2020 17:18:52  Intraventricular conduction delay now present  Q waves now present  Left anterior fascicular block no longer present  Myocardial infarct finding no longer prese nt     Lipase    Collection Time: 20  8:57 PM   Result Value Ref Range    Lipase 72 11 - 82 U/L   LACTIC ACID    Collection Time: 20  8:57 PM   Result Value Ref Range    Lactic Acid 2.9 (H) 0.5 - 2.0 mmol/L   APTT    Collection Time: 20  8:57 PM   Result Value Ref Range    APTT 33.8 24.7 - 36.0 sec   PT/INR    Collection Time: 20  8:57 PM   Result Value Ref Range    PT 18.9 (H) 12.0 - 14.6 sec    INR 1.53 (H) 0.87 - 1.13   proBrain Natriuretic Peptide,  NT    Collection Time: 11/08/20  8:57 PM   Result Value Ref Range    NT-proBNP 6863 (H) 0 - 125 pg/mL   COVID/SARS CoV-2 PCR    Collection Time: 11/08/20  8:57 PM    Specimen: Nasopharyngeal; Respirate   Result Value Ref Range    COVID Order Status Received    CoV-2, Flu A/B, And RSV by PCR    Collection Time: 11/08/20  8:57 PM   Result Value Ref Range    Influenza virus A RNA Negative Negative    Influenza virus B, PCR Negative Negative    RSV, PCR Negative Negative    SARS-CoV-2 by PCR NotDetected     SARS-CoV-2 Source NP Swab    Heparin Xa (Unfractionated)    Collection Time: 11/08/20  8:57 PM   Result Value Ref Range    Heparin Xa (UFH) <0.10 IU/mL   EC-ECHOCARDIOGRAM COMPLETE W/ CONT    Collection Time: 11/08/20 11:40 PM   Result Value Ref Range    Left Ventrical Ejection Fraction 10    URINE DRUG SCREEN    Collection Time: 11/09/20 12:10 AM   Result Value Ref Range    Amphetamines Urine Positive (A) Negative    Barbiturates Negative Negative    Benzodiazepines Negative Negative    Cocaine Metabolite Negative Negative    Methadone Negative Negative    Opiates Negative Negative    Oxycodone Negative Negative    Phencyclidine -Pcp Negative Negative    Propoxyphene Negative Negative    Cannabinoid Metab Negative Negative   TROPONIN    Collection Time: 11/09/20  2:25 AM   Result Value Ref Range    Troponin T 39 (H) 6 - 19 ng/L   AMMONIA    Collection Time: 11/09/20  2:25 AM   Result Value Ref Range    Ammonia 16 11 - 45 umol/L   FREE THYROXINE    Collection Time: 11/09/20  2:25 AM   Result Value Ref Range    Free T-4 1.51 0.93 - 1.70 ng/dL   TRIIDOTHYRONINE    Collection Time: 11/09/20  2:25 AM   Result Value Ref Range    T3 59.3 (L) 60.0 - 181.0 ng/dL   TROPONIN    Collection Time: 11/09/20  6:20 AM   Result Value Ref Range    Troponin T 38 (H) 6 - 19 ng/L       Imaging/Procedures Review:    Chest Xray:  Reviewed    EKG:   As in HPI.     MDM (Assessment and Plan):     Active Hospital Problems    Diagnosis   •  Biventricular failure ([facility]) [I50.82]   • Pain in the chest [R07.9]   • Right ventricular apical thrombus [I51.3]   • Left ventricular apical thrombus [I51.3]   • Elevated transaminase level [R74.01]   • RED (acute kidney injury) ([facility]) [N17.9]   • Pneumonia [J18.9]   • QT prolongation [R94.31]   • CAD (coronary artery disease) [I25.10]   • Elevated TSH [R79.89]   • Methamphetamine abuse ([facility]) [F15.10]   • Peptic ulcer disease [K27.9]   • HFrEF (heart failure with reduced ejection fraction) ([facility]) [I50.20]   • Left ventricular systolic dysfunction, NYHA class 3 [I51.9]         At this time, patient certainly is experiencing CHF exacerbation.  We will optimize diuresis with IV Lasix 40 mg twice a day.  We will start patient on low-dose carvedilol at 3.125 mg p.o. twice a day.  We will hold off on ACE inhibitor or ARB and spironolactone due to reduced GFR at this time.  Consider adding as patient improves.    In terms of etiology of biventricular failure, suspect methamphetamine abuse.  Methamphetamine cessation advised.    In terms of biventricular apical thrombus, will start anticoagulation via protocol.      Thank you for referring this patient to our cardiology service.  We will follow patient with you.      Jovita Gooden MD.   Cardiology Inpatient Service.  [facility].  [phone].  [facility].

## 2020-11-09 NOTE — ASSESSMENT & PLAN NOTE
Likely secondary to congestive hepatopathy  RUQ US showed hepatic steatosis and Cholelithiasis with no signs of cholecystitis,  Hepatitis panel negative, normal ammonia levels

## 2020-11-09 NOTE — ED NOTES
Patient rounded no new or worsening symptoms.  sent second trop and second EKG.  EKG shown to ERP.

## 2020-11-09 NOTE — ED TRIAGE NOTES
epigastric pain with increased belching x 1 hour.  No n/v.  Started after eating a buffalo burger.

## 2020-11-09 NOTE — ASSESSMENT & PLAN NOTE
Likely ischemic with recent MI, methamphetamine use may play a component as well  LVEF 10%  BB, ACE-I, diuresis  May require inotropic medication infusion  Cardiology on board, f/u consult  Heparin infusion for biventricular thrombi

## 2020-11-09 NOTE — ASSESSMENT & PLAN NOTE
Confirmed on echo  Bridging from heparin drip to warfarin. Stop heparin once INR>2  Cardiology on board  Order for anticoagulation clinic via  11/12

## 2020-11-09 NOTE — ASSESSMENT & PLAN NOTE
Echo confirmed  heparin infusion  Cardiology on board  Likely due to severely reduced EF  Hypercoagulable state less likely

## 2020-11-09 NOTE — ED NOTES
"Pt states \"I was at [facility], was told I had a blood clot in my lungs, I freaked out and left the hospital\"  "

## 2020-11-09 NOTE — CONSULTS
Critical Care Consultation    Date of consult: 11/8/2020    Referring Physician  Cortez Delgado MD    Reason for Consultation  Bi-Venticular failure with biventricular thrombi and multiorgan disfunction    History of Presenting Illness  50 y.o. male with a pmhx of PUD, CAD, HFrEF (15% in June), methamphetamine abuse, tobacco abuse, EtOH use who presented 11/8/2020 with epigastric/chest pain described as cramping and non-radiating. The pain began while he was watching TV and worsened throughout the day in a waxing/waining fashion. This pain is associated with some dyspnea and a nonproductive cough. The patient states he has been non-compliant with his CAD meds do to recently getting kicked out of his girlsfriends house. He also admits to daily cigarette smoking and occasional meth use (last use 3 days ago). He reports that approximately 6 weeks he was diagnosed with a PE at [facility] but he left there because he was scared of the diagnosis. He states that his lower extremities have been more swollen today.    He denies and fever/chills, travel, hormone replacement therapy, personal or family hx of VTE and is not currently on OAC.    Code Status  Full Code    Review of Systems  Review of Systems   Constitutional: Negative for chills and fever.   Eyes: Negative for blurred vision.   Respiratory: Positive for cough and shortness of breath. Negative for sputum production and stridor.    Cardiovascular: Positive for chest pain (epigastric/lower chest) and leg swelling.   Gastrointestinal: Positive for abdominal pain (epigastric/lower chest). Negative for constipation, diarrhea, nausea and vomiting.   Genitourinary: Negative for dysuria.   Musculoskeletal: Negative for myalgias.   Skin: Negative for rash.   Neurological: Negative for dizziness, sensory change and focal weakness.   Psychiatric/Behavioral: Positive for suicidal ideas.       Past Medical History   has a past medical history of  appendectomy.    Surgical History   has a past surgical history that includes appendectomy.    Family History  family history is not on file.    Social History   reports that he has been smoking cigarettes. He has been smoking about 0.25 packs per day. He does not have any smokeless tobacco history on file. He reports current alcohol use. He reports that he does not use drugs.    Medications  Home Medications     Reviewed by Gill Richardson on 11/08/20 at 2245  Med List Status: Complete   Medication Last Dose Status   atorvastatin (LIPITOR) 20 MG Tab 11/7/2020 Active   furosemide (LASIX) 20 MG Tab 11/8/2020 Active   prasugrel (EFFIENT) 10 MG Tab 11/8/2020 Active   spironolactone (ALDACTONE) 25 MG Tab 11/8/2020 Active              Current Facility-Administered Medications   Medication Dose Route Frequency Provider Last Rate Last Admin   • azithromycin (ZITHROMAX) injection 500 mg  500 mg Intravenous Once Cortez Delgado M.D.         Current Outpatient Medications   Medication Sig Dispense Refill   • spironolactone (ALDACTONE) 25 MG Tab Take 25 mg by mouth every day.     • atorvastatin (LIPITOR) 20 MG Tab Take 40 mg by mouth every evening.     • prasugrel (EFFIENT) 10 MG Tab Take 10 mg by mouth every day.     • furosemide (LASIX) 20 MG Tab Take 20 mg by mouth every day.         Allergies  No Known Allergies    Vital Signs last 24 hours  Temp:  [36 °C (96.8 °F)-37.2 °C (98.9 °F)] 37.2 °C (98.9 °F)  Pulse:  [101-110] 101  Resp:  [16-26] 22  BP: ()/(73-80) 104/80  SpO2:  [96 %-98 %] 97 %    Physical Exam  Physical Exam  Vitals signs and nursing note reviewed.   Constitutional:       General: He is not in acute distress.     Appearance: Normal appearance. He is well-developed and normal weight. He is ill-appearing (chronically ill appearing).   HENT:      Head: Normocephalic and atraumatic.      Right Ear: External ear normal.      Left Ear: External ear normal.      Nose: Nose normal.      Mouth/Throat:       Mouth: Mucous membranes are moist.   Eyes:      Extraocular Movements: Extraocular movements intact.      Conjunctiva/sclera: Conjunctivae normal.   Neck:      Musculoskeletal: Neck supple.      Vascular: No JVD.      Trachea: No tracheal deviation.   Cardiovascular:      Rate and Rhythm: Normal rate and regular rhythm.      Pulses: Normal pulses.      Heart sounds: Murmur present. Systolic murmur present with a grade of 2/6.   Pulmonary:      Effort: Pulmonary effort is normal.      Breath sounds: Rhonchi present. No rales.   Abdominal:      General: Bowel sounds are normal. There is no distension.      Palpations: Abdomen is soft.   Musculoskeletal:         General: No tenderness.      Right lower le+ Pitting Edema present.      Left lower le+ Pitting Edema present.   Skin:     General: Skin is warm and dry.      Capillary Refill: Capillary refill takes less than 2 seconds.      Findings: No rash.      Comments: No Janeway lesions or splinter hemorrhages    Neurological:      General: No focal deficit present.      Mental Status: He is alert and oriented to person, place, and time.      Sensory: No sensory deficit.      Motor: No weakness.   Psychiatric:         Mood and Affect: Mood normal.         Behavior: Behavior normal.         Fluids    Intake/Output Summary (Last 24 hours) at 2020 2325  Last data filed at 2020 2249  Gross per 24 hour   Intake 600 ml   Output --   Net 600 ml       Laboratory  Recent Results (from the past 48 hour(s))   EKG (NOW)    Collection Time: 20  5:18 PM   Result Value Ref Range    Report       [facility] Emergency Dept.    Test Date:  2020  Pt Name:    CATRACHITO WOODS                Department: ER  MRN:        [id number]                      Room:  Gender:     Male                         Technician: [id number]  :        1970                   Requested By:ER TRIAGE PROTOCOL  Order #:    [id number]                    Reading  MD:    Measurements  Intervals                                Axis  Rate:       111                          P:          74  WV:         136                          QRS:        -89  QRSD:       120                          T:          72  QT:         364  QTc:        495    Interpretive Statements  SINUS TACHYCARDIA  PROBABLE LEFT ATRIAL ABNORMALITY  LEFT ANTERIOR FASCICULAR BLOCK  LEFT VENTRICULAR HYPERTROPHY  ANTERIOR INFARCT, AGE INDETERMINATE  Compared to ECG 06/19/2014 22:56:19  Left anterior fascicular block now present  Left ventricular hypertrophy now present  Myocardial infarct finding now present  Sinus rhythm no longer  present     CBC with Differential    Collection Time: 11/08/20  6:02 PM   Result Value Ref Range    WBC 9.1 4.8 - 10.8 K/uL    RBC 5.61 4.70 - 6.10 M/uL    Hemoglobin 16.4 14.0 - 18.0 g/dL    Hematocrit 50.6 42.0 - 52.0 %    MCV 90.2 81.4 - 97.8 fL    MCH 29.2 27.0 - 33.0 pg    MCHC 32.4 (L) 33.7 - 35.3 g/dL    RDW 46.4 35.9 - 50.0 fL    Platelet Count 290 164 - 446 K/uL    MPV 11.9 9.0 - 12.9 fL    Neutrophils-Polys 61.50 44.00 - 72.00 %    Lymphocytes 21.40 (L) 22.00 - 41.00 %    Monocytes 11.80 0.00 - 13.40 %    Eosinophils 3.90 0.00 - 6.90 %    Basophils 1.00 0.00 - 1.80 %    Immature Granulocytes 0.40 0.00 - 0.90 %    Nucleated RBC 0.00 /100 WBC    Neutrophils (Absolute) 5.58 1.82 - 7.42 K/uL    Lymphs (Absolute) 1.94 1.00 - 4.80 K/uL    Monos (Absolute) 1.07 (H) 0.00 - 0.85 K/uL    Eos (Absolute) 0.35 0.00 - 0.51 K/uL    Baso (Absolute) 0.09 0.00 - 0.12 K/uL    Immature Granulocytes (abs) 0.04 0.00 - 0.11 K/uL    NRBC (Absolute) 0.00 K/uL   Complete Metabolic Panel (CMP)    Collection Time: 11/08/20  6:02 PM   Result Value Ref Range    Sodium 128 (L) 135 - 145 mmol/L    Potassium 4.0 3.6 - 5.5 mmol/L    Chloride 90 (L) 96 - 112 mmol/L    Co2 21 20 - 33 mmol/L    Anion Gap 17.0 (H) 7.0 - 16.0    Glucose 112 (H) 65 - 99 mg/dL    Bun 50 (H) 8 - 22 mg/dL    Creatinine 1.75 (H) 0.50 -  1.40 mg/dL    Calcium 9.1 8.5 - 10.5 mg/dL    AST(SGOT) 685 (H) 12 - 45 U/L    ALT(SGPT) 847 (H) 2 - 50 U/L    Alkaline Phosphatase 209 (H) 30 - 99 U/L    Total Bilirubin 2.8 (H) 0.1 - 1.5 mg/dL    Albumin 3.5 3.2 - 4.9 g/dL    Total Protein 7.0 6.0 - 8.2 g/dL    Globulin 3.5 1.9 - 3.5 g/dL    A-G Ratio 1.0 g/dL   Troponin    Collection Time: 20  6:02 PM   Result Value Ref Range    Troponin T 44 (H) 6 - 19 ng/L   ESTIMATED GFR    Collection Time: 20  6:02 PM   Result Value Ref Range    GFR If African American 50 (A) >60 mL/min/1.73 m 2    GFR If Non  41 (A) >60 mL/min/1.73 m 2   TROPONIN    Collection Time: 20  7:49 PM   Result Value Ref Range    Troponin T 39 (H) 6 - 19 ng/L   EKG    Collection Time: 20  7:52 PM   Result Value Ref Range    Report       [facility] Emergency Dept.    Test Date:  2020  Pt Name:    CATRACHITO WOODS                Department: ER  MRN:        [id number]                      Room:  Gender:     Male                         Technician: [id number]  :        1970                   Requested By:ER TRIAGE PROTOCOL  Order #:    [id number]                    Reading MD:    Measurements  Intervals                                Axis  Rate:       106                          P:          79  DC:         144                          QRS:        -80  QRSD:       128                          T:          83  QT:         388  QTc:        516    Interpretive Statements  SINUS TACHYCARDIA  LEFT ATRIAL ABNORMALITY  NONSPECIFIC IVCD WITH LAD  LEFT VENTRICULAR HYPERTROPHY  ANTERIOR Q WAVES, POSSIBLY DUE TO LVH  Compared to ECG 2020 17:18:52  Intraventricular conduction delay now present  Q waves now present  Left anterior fascicular block no longer present  Myocardial infarct finding no longer prese nt     Lipase    Collection Time: 20  8:57 PM   Result Value Ref Range    Lipase 72 11 - 82 U/L   LACTIC ACID    Collection Time: 20   8:57 PM   Result Value Ref Range    Lactic Acid 2.9 (H) 0.5 - 2.0 mmol/L   APTT    Collection Time: 11/08/20  8:57 PM   Result Value Ref Range    APTT 33.8 24.7 - 36.0 sec   PT/INR    Collection Time: 11/08/20  8:57 PM   Result Value Ref Range    PT 18.9 (H) 12.0 - 14.6 sec    INR 1.53 (H) 0.87 - 1.13   proBrain Natriuretic Peptide, NT    Collection Time: 11/08/20  8:57 PM   Result Value Ref Range    NT-proBNP 6863 (H) 0 - 125 pg/mL   COVID/SARS CoV-2 PCR    Collection Time: 11/08/20  8:57 PM    Specimen: Nasopharyngeal; Respirate   Result Value Ref Range    COVID Order Status Received    CoV-2, Flu A/B, And RSV by PCR    Collection Time: 11/08/20  8:57 PM   Result Value Ref Range    Influenza virus A RNA Negative Negative    Influenza virus B, PCR Negative Negative    RSV, PCR Negative Negative    SARS-CoV-2 by PCR NotDetected     SARS-CoV-2 Source NP Swab        Imaging  EC-ECHOCARDIOGRAM COMPLETE W/ CONT   Final Result      CT-CTA CHEST PULMONARY ARTERY W/ RECONS   Final Result      Left lower lobe consolidation and groundglass greater than right lower lobe groundglass. This is worrisome for atypical infection such as Covid 19. Given some central lucency in the left lower lobe, necrotizing pneumonia, septic embolus and/or malignancy    are possible and follow-up to resolution is advised      Left worse than right heart enlargement with definite right, probable left ventricular apex intraluminal filling defects highly likely to represent thrombus. Echocardiogram is recommended to clarify      No CT evidence of pulmonary thromboembolism      DX-CHEST-PORTABLE (1 VIEW)   Final Result      Mild cardiac silhouette enlargement with some left basilar opacity that could be from consolidation, nodule or atelectasis. Follow-up to resolution is recommended      US-ABDOMEN COMPLETE SURVEY    (Results Pending)   US-EXTREMITY VENOUS LOWER BILAT    (Results Pending)       Assessment/Plan  * Biventricular failure (HCC)-  (present on admission)  Assessment & Plan  Likely ischemic with recent MI, methamphetamine use may play a component as well  LVEF 10%  BB, ACE-I, diuresis  May require inotropic medication infusion  Cardiology on board, f/u consult  Heparin infusion for biventricular thrombi    Peptic ulcer disease- (present on admission)  Assessment & Plan  Patient reports a prior surgery to repair a gastric ulcer  omeprazole    Methamphetamine abuse (HCC)- (present on admission)  Assessment & Plan  Last used 3 days ago  Counseling when appropriate    Elevated TSH- (present on admission)  Assessment & Plan  Check T3, fT4  Consider synthroid if low    CAD (coronary artery disease)- (present on admission)  Assessment & Plan  Recent PCI in June at [place]  Statin, antiplatelet    QT prolongation- (present on admission)  Assessment & Plan  QTc 513 on EKG  Limit QTc prolonging meds  Tele monitoring    Pneumonia- (present on admission)  Assessment & Plan  LLL on CTPA  Start CAP coverage  ?necrosis , may need a HRCT in the future  F/U blood cultures and sputum cultures  Influenza and COVID negative    RED (acute kidney injury) (HCC)- (present on admission)  Assessment & Plan  ?Cardiorenal vs other. Unknown baseline Cr  IVF  Renal dose meds, avoid nephrotoxins  Strict I/Os  Follow renal function  Renal US    Elevated transaminase level- (present on admission)  Assessment & Plan  ? Congestive hepatopathy vs thrombus  RUQ US, hepatitis panel, ammonia  Limit hepatotoxins  Monitor synthetic function    Left ventricular apical thrombus- (present on admission)  Assessment & Plan  Echo confirmed  Start heparin infusion  Cardiology on board  Likely due to severely reduced EF  Hypercoagulable state less likely    Right ventricular apical thrombus- (present on admission)  Assessment & Plan  Heparin infusion  At high risk for embolization with systemic thrombolysis  LE US to R/O DVT    Pain in the chest- (present on admission)  Assessment &  Plan  Epigastric/lower chest  Trend trop  RUQ US  Lipase normal  Echo reviewed       Discussed patient condition and risk of morbidity and/or mortality with Hospitalist, RN, RT, Pharmacy, Code status disscussed, Patient and ERP.      The patient remains critically ill.  Critical care time = 48 minutes in directly providing and coordinating critical care and extensive data review.  No time overlap and excludes procedures.

## 2020-11-09 NOTE — ASSESSMENT & PLAN NOTE
? Congestive hepatopathy vs thrombus  RUQ US, hepatitis panel, ammonia  Limit hepatotoxins  Monitor synthetic function

## 2020-11-09 NOTE — ASSESSMENT & PLAN NOTE
Likely ischemic with recent MI, methamphetamine use may play a component  Echo LVEF of 10%  Cardiology consulting  If medically compliant and drug free consider future AICD  Aldactone, warfarin, lisinopril, coreg

## 2020-11-09 NOTE — PROGRESS NOTES
Critical Care Progress Note    Date of admission  11/8/2020    Chief Complaint  50 y.o. male admitted 11/8/2020 with Bi-Venticular failure with biventricular thrombi and multiorgan disfunction    Hospital Course  No notes on file  50 y.o. male with a pmhx of PUD, CAD, HFrEF (15% in June), methamphetamine abuse, tobacco abuse, EtOH use who presented 11/8/2020 with epigastric/chest pain described as cramping and non-radiating. The pain began while he was watching TV and worsened throughout the day in a waxing/waining fashion. This pain is associated with some dyspnea and a nonproductive cough. The patient states he has been non-compliant with his CAD meds do to recently getting kicked out of his girlsfriends house. He also admits to daily cigarette smoking and occasional meth use (last use 3 days ago). He reports that approximately 6 weeks he was diagnosed with a PE at [facility] but he left there because he was scared of the diagnosis. He states that his lower extremities have been more swollen today.     He denies and fever/chills, travel, hormone replacement therapy, personal or family hx of VTE and is not currently on OAC    Interval Problem Update  Reviewed last 24 hour events:  Afebrile  GCS 15  HR 80-90s  -110s  GCS 15  Na 128  AG 17  Cr 1.75        Review of Systems  Review of Systems   Unable to perform ROS: Acuity of condition        Vital Signs for last 24 hours   Temp:  [36 °C (96.8 °F)-37.2 °C (98.9 °F)] 36.6 °C (97.9 °F)  Pulse:  [] 89  Resp:  [12-27] 19  BP: ()/(58-80) 100/66  SpO2:  [96 %-99 %] 98 %    Hemodynamic parameters for last 24 hours       Respiratory Information for the last 24 hours       Physical Exam   Physical Exam  Constitutional:       Appearance: He is ill-appearing.   HENT:      Head: Normocephalic and atraumatic.      Right Ear: External ear normal.      Left Ear: External ear normal.      Nose: Nose normal.      Mouth/Throat:      Pharynx: Oropharynx is clear.    Eyes:      Extraocular Movements: Extraocular movements intact.      Pupils: Pupils are equal, round, and reactive to light.   Cardiovascular:      Rate and Rhythm: Normal rate and regular rhythm.      Pulses: Normal pulses.   Pulmonary:      Effort: Pulmonary effort is normal.   Abdominal:      General: Abdomen is flat. Bowel sounds are normal.   Musculoskeletal:      Right lower leg: Edema present.      Left lower leg: Edema present.   Skin:     Capillary Refill: Capillary refill takes 2 to 3 seconds.   Neurological:      General: No focal deficit present.      Mental Status: He is alert.      Motor: No weakness.         Medications  Current Facility-Administered Medications   Medication Dose Route Frequency Provider Last Rate Last Admin   • carvedilol (COREG) tablet 3.125 mg  3.125 mg Oral BID WITH MEALS Jovita Gooden M.D.   Stopped at 11/09/20 0030   • cefTRIAXone (ROCEPHIN) 2 g in  mL IVPB  2 g Intravenous Q24HRS KAREL Pompa Jr..O.   Stopped at 11/09/20 0624   • senna-docusate (PERICOLACE or SENOKOT S) 8.6-50 MG per tablet 2 Tab  2 Tab Oral BID KAREL Pompa Jr..O.   2 Tab at 11/09/20 0551    And   • polyethylene glycol/lytes (MIRALAX) PACKET 1 Packet  1 Packet Oral QDAY PRN KAREL Pompa Jr..O.        And   • magnesium hydroxide (MILK OF MAGNESIA) suspension 30 mL  30 mL Oral QDAY PRN KAREL Pompa Jr..O.        And   • bisacodyl (DULCOLAX) suppository 10 mg  10 mg Rectal QDAY PRN KAREL Pompa Jr..O.       • acetaminophen (Tylenol) tablet 650 mg  650 mg Oral Q6HRS PRN KAREL Pompa Jr..O.       • Pharmacy Consult Request ...Pain Management Review 1 Each  1 Each Other PHARMACY TO DOSE KAREL Pompa Jr..JULY.        And   • oxyCODONE immediate-release (ROXICODONE) tablet 2.5 mg  2.5 mg Oral Q3HRS PRN KAREL Pompa Jr..O.        And   • oxyCODONE immediate-release (ROXICODONE) tablet 5 mg  5 mg Oral Q3HRS PRN KAREL Pompa Jr..O.        And   • morphine  (pf) 4 mg/mL injection 2 mg  2 mg Intravenous Q3HRS PRN Vadim Edge Jr. D.O.       • nicotine (NICODERM) 14 MG/24HR 14 mg  14 mg Transdermal Daily-0600 KAREL Pompa Jr..O.   14 mg at 11/09/20 0553    And   • nicotine polacrilex (NICORETTE) 2 MG piece 2 mg  2 mg Oral Q HOUR PRN Vadim Edge Jr. D.O.       • doxycycline (VIBRAMYCIN) 100 mg in  mL IVPB  100 mg Intravenous Q12HRS Vadim Edge Jr. D.O. 100 mL/hr at 11/09/20 0554 100 mg at 11/09/20 0554   • spironolactone (ALDACTONE) tablet 25 mg  25 mg Oral DAILY Vadim Edge Jr. D.O.   25 mg at 11/09/20 0553   • atorvastatin (LIPITOR) tablet 40 mg  40 mg Oral Nightly Vadim Edge Jr. D.O.   40 mg at 11/09/20 0205   • prasugrel (EFFIENT) tablet 10 mg  10 mg Oral DAILY Vadim Edge Jr., D.O.   10 mg at 11/09/20 0553   • omeprazole (PRILOSEC) capsule 20 mg  20 mg Oral DAILY Vadim Edge Jr. D.O.   20 mg at 11/09/20 0553   • furosemide (LASIX) injection 40 mg  40 mg Intravenous BID DIURETIC Jovita Gooden M.D.       • heparin infusion 25,000 units in 500 mL 0.45% NACL  0-30 Units/kg/hr Intravenous Continuous KAREL Pompa Jr..O. 29.4 mL/hr at 11/08/20 2352 18 Units/kg/hr at 11/08/20 2352   • heparin injection 3,300 Units  40 Units/kg Intravenous PRN Vadim Edge Jr. D.O.           Fluids    Intake/Output Summary (Last 24 hours) at 11/9/2020 0700  Last data filed at 11/9/2020 0120  Gross per 24 hour   Intake 600 ml   Output 325 ml   Net 275 ml       Laboratory          Recent Labs     11/08/20  1802   SODIUM 128*   POTASSIUM 4.0   CHLORIDE 90*   CO2 21   BUN 50*   CREATININE 1.75*   CALCIUM 9.1     Recent Labs     11/08/20 1802 11/08/20 2057   ALTSGPT 847*  --    ASTSGOT 685*  --    ALKPHOSPHAT 209*  --    TBILIRUBIN 2.8*  --    LIPASE  --  72   GLUCOSE 112*  --      Recent Labs     11/08/20 1802   WBC 9.1   NEUTSPOLYS 61.50   LYMPHOCYTES 21.40*   MONOCYTES 11.80   EOSINOPHILS 3.90   BASOPHILS 1.00   ASTSGOT 685*    ALTSGPT 847*   ALKPHOSPHAT 209*   TBILIRUBIN 2.8*     Recent Labs     11/08/20  1802 11/08/20 2057   RBC 5.61  --    HEMOGLOBIN 16.4  --    HEMATOCRIT 50.6  --    PLATELETCT 290  --    PROTHROMBTM  --  18.9*   APTT  --  33.8   INR  --  1.53*       Imaging  X-Ray:  I have personally reviewed the images and compared with prior images.  EKG:  I have personally reviewed the images and compared with prior images.    Assessment/Plan  * Biventricular failure (HCC)- (present on admission)  Assessment & Plan  Likely ischemic with recent MI, methamphetamine use may play a component as well  LVEF 10%  BB, ACE-I, diuresis  May require inotropic medication infusion  Cardiology on board, f/u consult  Heparin infusion for biventricular thrombi    Peptic ulcer disease- (present on admission)  Assessment & Plan  Patient reports a prior surgery to repair a gastric ulcer  omeprazole    Methamphetamine abuse (HCC)- (present on admission)  Assessment & Plan  Last used 3 days ago  Counseling when appropriate    Elevated TSH- (present on admission)  Assessment & Plan  Check T3, fT4  Consider synthroid if low    CAD (coronary artery disease)- (present on admission)  Assessment & Plan  Recent PCI in June at [place]  Statin, antiplatelet    QT prolongation- (present on admission)  Assessment & Plan  QTc 513 on EKG  Limit QTc prolonging meds  Tele monitoring    Pneumonia- (present on admission)  Assessment & Plan  LLL on CTPA  CAP coverage  ?necrosis , may need a HRCT in the future  F/U blood cultures and sputum cultures  Influenza and COVID negative    RED (acute kidney injury) (HCC)- (present on admission)  Assessment & Plan  ?Cardiorenal vs other. Unknown baseline Cr  IVF  Renal dose meds, avoid nephrotoxins  Strict I/Os  Follow renal function  Renal US    Elevated transaminase level- (present on admission)  Assessment & Plan  ? Congestive hepatopathy vs thrombus  RUQ US, hepatitis panel, ammonia  Limit hepatotoxins  Monitor  synthetic function    Left ventricular apical thrombus- (present on admission)  Assessment & Plan  Echo confirmed  heparin infusion  Cardiology on board  Likely due to severely reduced EF  Hypercoagulable state less likely    Right ventricular apical thrombus- (present on admission)  Assessment & Plan  Heparin infusion  At high risk for embolization with systemic thrombolysis      Pain in the chest- (present on admission)  Assessment & Plan  Epigastric/lower chest  Trend trop  RUQ US  Lipase normal  Echo reviewed    Palliative care consulted. Long term poor prognosis.    VTE:  Heparin  Ulcer: Not Indicated  Lines: None    I have performed a physical exam and reviewed and updated ROS and Plan today (11/9/2020). In review of yesterday's note (11/8/2020), there are no changes except as documented above.     Discussed patient condition and risk of morbidity and/or mortality with RN, RT, Pharmacy, Code status disscussed, Charge nurse / hot rounds, Patient and cardiology  The patient remains critically ill.  Critical care time = 31 minutes in directly providing and coordinating critical care and extensive data review.  No time overlap and excludes procedures.

## 2020-11-10 LAB
ALBUMIN SERPL BCP-MCNC: 3.2 G/DL (ref 3.2–4.9)
ALBUMIN/GLOB SERPL: 0.9 G/DL
ALP SERPL-CCNC: 215 U/L (ref 30–99)
ALT SERPL-CCNC: 763 U/L (ref 2–50)
ANION GAP SERPL CALC-SCNC: 13 MMOL/L (ref 7–16)
AST SERPL-CCNC: 511 U/L (ref 12–45)
BASOPHILS # BLD AUTO: 0.7 % (ref 0–1.8)
BASOPHILS # BLD: 0.06 K/UL (ref 0–0.12)
BILIRUB CONJ SERPL-MCNC: 0.8 MG/DL (ref 0.1–0.5)
BILIRUB INDIRECT SERPL-MCNC: 0.8 MG/DL (ref 0–1)
BILIRUB SERPL-MCNC: 1.6 MG/DL (ref 0.1–1.5)
BUN SERPL-MCNC: 41 MG/DL (ref 8–22)
CALCIUM SERPL-MCNC: 8.6 MG/DL (ref 8.5–10.5)
CHLORIDE SERPL-SCNC: 92 MMOL/L (ref 96–112)
CO2 SERPL-SCNC: 20 MMOL/L (ref 20–33)
CREAT SERPL-MCNC: 1.23 MG/DL (ref 0.5–1.4)
EOSINOPHIL # BLD AUTO: 0.36 K/UL (ref 0–0.51)
EOSINOPHIL NFR BLD: 4.3 % (ref 0–6.9)
ERYTHROCYTE [DISTWIDTH] IN BLOOD BY AUTOMATED COUNT: 47.5 FL (ref 35.9–50)
GLOBULIN SER CALC-MCNC: 3.4 G/DL (ref 1.9–3.5)
GLUCOSE SERPL-MCNC: 152 MG/DL (ref 65–99)
HCT VFR BLD AUTO: 50.3 % (ref 42–52)
HGB BLD-MCNC: 16.2 G/DL (ref 14–18)
IMM GRANULOCYTES # BLD AUTO: 0.06 K/UL (ref 0–0.11)
IMM GRANULOCYTES NFR BLD AUTO: 0.7 % (ref 0–0.9)
INR PPP: 1.76 (ref 0.87–1.13)
LACTATE BLD-SCNC: 2 MMOL/L (ref 0.5–2)
LYMPHOCYTES # BLD AUTO: 1.88 K/UL (ref 1–4.8)
LYMPHOCYTES NFR BLD: 22.2 % (ref 22–41)
MAGNESIUM SERPL-MCNC: 1.9 MG/DL (ref 1.5–2.5)
MCH RBC QN AUTO: 28.9 PG (ref 27–33)
MCHC RBC AUTO-ENTMCNC: 32.2 G/DL (ref 33.7–35.3)
MCV RBC AUTO: 89.8 FL (ref 81.4–97.8)
MONOCYTES # BLD AUTO: 0.82 K/UL (ref 0–0.85)
MONOCYTES NFR BLD AUTO: 9.7 % (ref 0–13.4)
NEUTROPHILS # BLD AUTO: 5.27 K/UL (ref 1.82–7.42)
NEUTROPHILS NFR BLD: 62.4 % (ref 44–72)
NRBC # BLD AUTO: 0.04 K/UL
NRBC BLD-RTO: 0.5 /100 WBC
NT-PROBNP SERPL IA-MCNC: 4444 PG/ML (ref 0–125)
PLATELET # BLD AUTO: 276 K/UL (ref 164–446)
PMV BLD AUTO: 12.1 FL (ref 9–12.9)
POTASSIUM SERPL-SCNC: 3.9 MMOL/L (ref 3.6–5.5)
PROT SERPL-MCNC: 6.6 G/DL (ref 6–8.2)
PROTHROMBIN TIME: 21.1 SEC (ref 12–14.6)
RBC # BLD AUTO: 5.6 M/UL (ref 4.7–6.1)
SODIUM SERPL-SCNC: 125 MMOL/L (ref 135–145)
UFH PPP CHRO-ACNC: 0.5 IU/ML
UFH PPP CHRO-ACNC: 0.76 IU/ML
UFH PPP CHRO-ACNC: 0.8 IU/ML
UFH PPP CHRO-ACNC: 0.81 IU/ML
WBC # BLD AUTO: 8.5 K/UL (ref 4.8–10.8)

## 2020-11-10 PROCEDURE — 700111 HCHG RX REV CODE 636 W/ 250 OVERRIDE (IP): Performed by: PSYCHIATRY & NEUROLOGY

## 2020-11-10 PROCEDURE — A9270 NON-COVERED ITEM OR SERVICE: HCPCS | Performed by: PSYCHIATRY & NEUROLOGY

## 2020-11-10 PROCEDURE — 700102 HCHG RX REV CODE 250 W/ 637 OVERRIDE(OP): Performed by: STUDENT IN AN ORGANIZED HEALTH CARE EDUCATION/TRAINING PROGRAM

## 2020-11-10 PROCEDURE — 700105 HCHG RX REV CODE 258: Performed by: INTERNAL MEDICINE

## 2020-11-10 PROCEDURE — 90686 IIV4 VACC NO PRSV 0.5 ML IM: CPT | Performed by: PSYCHIATRY & NEUROLOGY

## 2020-11-10 PROCEDURE — 99233 SBSQ HOSP IP/OBS HIGH 50: CPT | Mod: GC | Performed by: PSYCHIATRY & NEUROLOGY

## 2020-11-10 PROCEDURE — 80053 COMPREHEN METABOLIC PANEL: CPT

## 2020-11-10 PROCEDURE — A9270 NON-COVERED ITEM OR SERVICE: HCPCS | Performed by: STUDENT IN AN ORGANIZED HEALTH CARE EDUCATION/TRAINING PROGRAM

## 2020-11-10 PROCEDURE — 700102 HCHG RX REV CODE 250 W/ 637 OVERRIDE(OP): Performed by: INTERNAL MEDICINE

## 2020-11-10 PROCEDURE — 90471 IMMUNIZATION ADMIN: CPT

## 2020-11-10 PROCEDURE — 770020 HCHG ROOM/CARE - TELE (206)

## 2020-11-10 PROCEDURE — 83605 ASSAY OF LACTIC ACID: CPT

## 2020-11-10 PROCEDURE — 85025 COMPLETE CBC W/AUTO DIFF WBC: CPT

## 2020-11-10 PROCEDURE — 85520 HEPARIN ASSAY: CPT | Mod: 91

## 2020-11-10 PROCEDURE — 700111 HCHG RX REV CODE 636 W/ 250 OVERRIDE (IP): Performed by: INTERNAL MEDICINE

## 2020-11-10 PROCEDURE — 83880 ASSAY OF NATRIURETIC PEPTIDE: CPT

## 2020-11-10 PROCEDURE — 700101 HCHG RX REV CODE 250: Performed by: INTERNAL MEDICINE

## 2020-11-10 PROCEDURE — A9270 NON-COVERED ITEM OR SERVICE: HCPCS | Performed by: INTERNAL MEDICINE

## 2020-11-10 PROCEDURE — 36415 COLL VENOUS BLD VENIPUNCTURE: CPT

## 2020-11-10 PROCEDURE — 3E02340 INTRODUCTION OF INFLUENZA VACCINE INTO MUSCLE, PERCUTANEOUS APPROACH: ICD-10-PCS | Performed by: PSYCHIATRY & NEUROLOGY

## 2020-11-10 PROCEDURE — 82248 BILIRUBIN DIRECT: CPT

## 2020-11-10 PROCEDURE — 85610 PROTHROMBIN TIME: CPT

## 2020-11-10 PROCEDURE — 83735 ASSAY OF MAGNESIUM: CPT

## 2020-11-10 PROCEDURE — 700111 HCHG RX REV CODE 636 W/ 250 OVERRIDE (IP): Performed by: STUDENT IN AN ORGANIZED HEALTH CARE EDUCATION/TRAINING PROGRAM

## 2020-11-10 PROCEDURE — 700102 HCHG RX REV CODE 250 W/ 637 OVERRIDE(OP): Performed by: PSYCHIATRY & NEUROLOGY

## 2020-11-10 RX ORDER — WARFARIN SODIUM 2.5 MG/1
2.5 TABLET ORAL DAILY
Status: DISCONTINUED | OUTPATIENT
Start: 2020-11-10 | End: 2020-11-12

## 2020-11-10 RX ORDER — DOXYCYCLINE 100 MG/1
100 TABLET ORAL EVERY 12 HOURS
Status: DISCONTINUED | OUTPATIENT
Start: 2020-11-10 | End: 2020-11-11

## 2020-11-10 RX ORDER — ONDANSETRON 2 MG/ML
4 INJECTION INTRAMUSCULAR; INTRAVENOUS EVERY 4 HOURS PRN
Status: DISCONTINUED | OUTPATIENT
Start: 2020-11-10 | End: 2020-11-13 | Stop reason: HOSPADM

## 2020-11-10 RX ORDER — MAGNESIUM SULFATE HEPTAHYDRATE 40 MG/ML
2 INJECTION, SOLUTION INTRAVENOUS ONCE
Status: COMPLETED | OUTPATIENT
Start: 2020-11-10 | End: 2020-11-10

## 2020-11-10 RX ORDER — LISINOPRIL 5 MG/1
5 TABLET ORAL
Status: DISCONTINUED | OUTPATIENT
Start: 2020-11-10 | End: 2020-11-13 | Stop reason: HOSPADM

## 2020-11-10 RX ADMIN — FUROSEMIDE 40 MG: 10 INJECTION, SOLUTION INTRAMUSCULAR; INTRAVENOUS at 05:44

## 2020-11-10 RX ADMIN — NICOTINE 14 MG: 14 PATCH TRANSDERMAL at 05:44

## 2020-11-10 RX ADMIN — DOXYCYCLINE 100 MG: 100 TABLET, FILM COATED ORAL at 17:34

## 2020-11-10 RX ADMIN — HEPARIN SODIUM 16 UNITS/KG/HR: 5000 INJECTION, SOLUTION INTRAVENOUS at 21:50

## 2020-11-10 RX ADMIN — CEFTRIAXONE SODIUM 2 G: 2 INJECTION, POWDER, FOR SOLUTION INTRAMUSCULAR; INTRAVENOUS at 05:39

## 2020-11-10 RX ADMIN — LISINOPRIL 5 MG: 20 TABLET ORAL at 15:43

## 2020-11-10 RX ADMIN — PRASUGREL 10 MG: 10 TABLET, FILM COATED ORAL at 05:42

## 2020-11-10 RX ADMIN — WARFARIN SODIUM 2.5 MG: 2.5 TABLET ORAL at 17:34

## 2020-11-10 RX ADMIN — ONDANSETRON 4 MG: 2 INJECTION INTRAMUSCULAR; INTRAVENOUS at 20:08

## 2020-11-10 RX ADMIN — FUROSEMIDE 40 MG: 10 INJECTION, SOLUTION INTRAMUSCULAR; INTRAVENOUS at 15:40

## 2020-11-10 RX ADMIN — HEPARIN SODIUM 20 UNITS/KG/HR: 5000 INJECTION, SOLUTION INTRAVENOUS at 08:30

## 2020-11-10 RX ADMIN — ONDANSETRON 4 MG: 2 INJECTION INTRAMUSCULAR; INTRAVENOUS at 07:05

## 2020-11-10 RX ADMIN — CARVEDILOL 3.12 MG: 3.12 TABLET, FILM COATED ORAL at 17:34

## 2020-11-10 RX ADMIN — OMEPRAZOLE 20 MG: 20 CAPSULE, DELAYED RELEASE ORAL at 05:42

## 2020-11-10 RX ADMIN — MORPHINE SULFATE 2 MG: 4 INJECTION INTRAVENOUS at 21:26

## 2020-11-10 RX ADMIN — INFLUENZA A VIRUS A/GUANGDONG-MAONAN/SWL1536/2019 CNIC-1909 (H1N1) ANTIGEN (FORMALDEHYDE INACTIVATED), INFLUENZA A VIRUS A/HONG KONG/2671/2019 (H3N2) ANTIGEN (FORMALDEHYDE INACTIVATED), INFLUENZA B VIRUS B/PHUKET/3073/2013 ANTIGEN (FORMALDEHYDE INACTIVATED), AND INFLUENZA B VIRUS B/WASHINGTON/02/2019 ANTIGEN (FORMALDEHYDE INACTIVATED) 0.5 ML: 15; 15; 15; 15 INJECTION, SUSPENSION INTRAMUSCULAR at 15:40

## 2020-11-10 RX ADMIN — ATORVASTATIN CALCIUM 40 MG: 40 TABLET, FILM COATED ORAL at 20:08

## 2020-11-10 RX ADMIN — MAGNESIUM SULFATE 2 G: 2 INJECTION INTRAVENOUS at 12:11

## 2020-11-10 RX ADMIN — CARVEDILOL 3.12 MG: 3.12 TABLET, FILM COATED ORAL at 09:57

## 2020-11-10 RX ADMIN — SPIRONOLACTONE 25 MG: 25 TABLET ORAL at 05:42

## 2020-11-10 RX ADMIN — DOXYCYCLINE 100 MG: 100 INJECTION, POWDER, LYOPHILIZED, FOR SOLUTION INTRAVENOUS at 06:32

## 2020-11-10 ASSESSMENT — ENCOUNTER SYMPTOMS
DIZZINESS: 0
FOCAL WEAKNESS: 0
CHILLS: 0
ABDOMINAL PAIN: 1
DIARRHEA: 0
FEVER: 0
SPUTUM PRODUCTION: 0
SENSORY CHANGE: 0
VOMITING: 0
SORE THROAT: 0
NAUSEA: 0
MYALGIAS: 0
CONSTIPATION: 0
COUGH: 1
PALPITATIONS: 0
HEADACHES: 0
BLOOD IN STOOL: 0
SHORTNESS OF BREATH: 1

## 2020-11-10 ASSESSMENT — FIBROSIS 4 INDEX: FIB4 SCORE: 3.42

## 2020-11-10 ASSESSMENT — PAIN DESCRIPTION - PAIN TYPE
TYPE: ACUTE PAIN

## 2020-11-10 ASSESSMENT — LIFESTYLE VARIABLES: SUBSTANCE_ABUSE: 1

## 2020-11-10 NOTE — PROGRESS NOTES
[facility] Progress Note      Admit Date: 11/8/2020  Resident(s): Grace Avila M.D.  Attending: KVNG Good/ Dr. Tyler     Date & Time:   11/10/2020   10:36 AM       Patient ID:    Name:             Matt Ray   YOB: 1970  Age:                 50 y.o.  male   MRN:               [id number]    Diagnosis:  Biventricular heart failure with biventricular thrombi    ID:  This is a 50-year-old male with a history of CAD s/p stenting,HFrEF, history of PE, medication noncompliance, methamphetamine and tobacco abuse and peptic ulcer disease, who presented to the ED with shortness of breath, nonproductive cough, worsening bilateral pedal edema and epigastric pain.  He was borderline hypotensive and tachycardic at the time of presentation.  Blood work-up showed troponin of 39, EKG showed sinus tachycardia and no acute ST segment changes, BNP was elevated at 6863, chest x-ray showed left lower lobe opacities, CT-PE showed no signs of pulmonary embolism, however it showed cardiac dilatation with thrombi in the right and left ventricles and left lower lobe consolidation with a central cavity and groundglass opacities (left >right).  Ultrasound of the lower extremity showed no signs of DVT.  Echocardiogram showed an EF of 10% and confirmed the presence of thrombi in both the right and left ventricles.  Ultrasound of the abdomen showed hepatic steatosis and cholelithiasis with no signs of cholecystitis.  Patient was started on Lasix, carvedilol, heparin infusion and ceftriaxone + doxycycline for treatment of community-acquired pneumonia.    Interval Events:  -No acute events overnight  -Patient complains of fatigue and tiredness, denies worsening shortness of breath, chest pain or abdominal pain.  -Day 2 of ceftriaxone and doxycycline, cultures still pending  -No new complaints.  Discussed the prognosis of his heart failure and recommended palliative consult  -Labs show improved creatinine from 1.75 to 1.23,  lactic acid at 2.0 from 2.9, transaminases trending down  -Will switch from heparin drip to warfarin today.      Consultants:  Cardiology  Pulmonary critical care      Procedures:  None    Review of Systems   Constitutional: Negative for chills and fever.   HENT: Negative for congestion and sore throat.    Respiratory: Positive for cough and shortness of breath. Negative for sputum production.    Cardiovascular: Positive for leg swelling. Negative for chest pain and palpitations.   Gastrointestinal: Positive for abdominal pain. Negative for blood in stool, constipation, diarrhea, nausea and vomiting.   Genitourinary: Negative for dysuria and hematuria.   Musculoskeletal: Negative for myalgias.   Skin: Negative for rash.   Neurological: Negative for dizziness, sensory change, focal weakness and headaches.   Psychiatric/Behavioral: Positive for substance abuse.       VITALS:  Pulse: 89  Blood Pressure: (!) 98/71       Temp  Av.3 °C (97.3 °F)  Min: 36 °C (96.8 °F)  Max: 36.9 °C (98.4 °F)         Physical Exam:  Physical Exam   Constitutional: He is oriented to person, place, and time and well-developed, well-nourished, and in no distress. No distress.   HENT:   Head: Normocephalic.   Mouth/Throat: Oropharynx is clear and moist.   Eyes: Pupils are equal, round, and reactive to light. No scleral icterus.   Neck: Normal range of motion. JVD present.   Cardiovascular: Normal rate and regular rhythm.   Murmur heard.  Pulmonary/Chest: Effort normal. No respiratory distress. He has rales.   Abdominal: Soft. Bowel sounds are normal. He exhibits no distension.   Musculoskeletal: Normal range of motion.         General: Edema (1+) present. No deformity.   Neurological: He is alert and oriented to person, place, and time. No cranial nerve deficit. GCS score is 15.   Skin: Skin is warm. No erythema.   Psychiatric: Mood, affect and judgment normal.          HemoDynamics:  Pulse: 89 Blood Pressure: (!) 98/71      Respiratory:      Respiration: 16, Pulse Oximetry: 100 %      Fluids:  Intake/Output       11/08/20 0700 - 11/09/20 0659 11/09/20 0700 - 11/10/20 0659 11/10/20 0700 - 11/11/20 0659      [id number] 0563-7772 Total 0700-1859 1900-0659 Total 0700-1859 1900-0659 Total       Intake    I.V.  --  500 500  --  890.1 890.1  130.4  -- 130.4    Heparin Volume -- -- -- -- 890.1 890.1 130.4 -- 130.4    Volume (mL) (NS infusion 500 mL) -- 500 500 -- 0 0 -- -- --    IV Piggyback  --  100 100  --  -- --  200  -- 200    Volume (mL) (cefTRIAXone (ROCEPHIN) 2 g in  mL IVPB) -- 100 100 -- -- -- -- -- --    Volume (mL) (cefTRIAXone (ROCEPHIN) 2 g in  mL IVPB) -- -- -- -- -- -- 200 -- 200    Total Intake -- 600 600 -- 890.1 890.1 330.4 -- 330.4       Output    Urine  --  675 675  1900  -- 1900  --  -- --    Number of Times Voided -- 2 x 2 x -- 1 x 1 x -- -- --    Urine Void (mL) --  -- 1900 -- -- --    Stool  --  -- --  --  -- --  --  -- --    Number of Times Stooled -- -- -- -- 1 x 1 x 0 x -- 0 x    Total Output --  -- 1900 -- -- --       Net I/O     -- -75 -75 -1900 890.1 -1010 330.4 -- 330.4           Recent Labs     11/08/20  1802 11/09/20  1430 11/10/20  0200   SODIUM 128* 130* 125*   POTASSIUM 4.0 3.2* 3.9   CHLORIDE 90* 92* 92*   CO2 21 23 20   BUN 50* 35* 41*   CREATININE 1.75* 1.27 1.23   MAGNESIUM  --   --  1.9   CALCIUM 9.1 8.4* 8.6     Body mass index is 29.83 kg/m².    GI/Nutrition:  Recent Labs     11/08/20  1802 11/08/20  2057 11/09/20  1430 11/10/20  0200   ALTSGPT 847*  --  781* 763*   ASTSGOT 685*  --  583* 511*   ALKPHOSPHAT 209*  --  176* 215*   TBILIRUBIN 2.8*  --  1.9* 1.6*   DBILIRUBIN  --   --   --  0.8*   LIPASE  --  72  --   --    GLUCOSE 112*  --  121* 152*       Heme:  Recent Labs     11/08/20  1802 11/08/20  2057 11/10/20  0200   RBC 5.61  --  5.60   HEMOGLOBIN 16.4  --  16.2   HEMATOCRIT 50.6  --  50.3   PLATELETCT 290  --  276   PROTHROMBTM  --  18.9*  --    APTT  --  33.8  --    INR  --   1.53*  --        Infectious Disease:  Temp  Av.3 °C (97.3 °F)  Min: 36 °C (96.8 °F)  Max: 36.9 °C (98.4 °F)  Recent Labs     20  1802 20  1430 11/10/20  0200   WBC 9.1  --  8.5   NEUTSPOLYS 61.50  --  62.40   LYMPHOCYTES 21.40*  --  22.20   MONOCYTES 11.80  --  9.70   EOSINOPHILS 3.90  --  4.30   BASOPHILS 1.00  --  0.70   ASTSGOT 685* 583* 511*   ALTSGPT 847* 781* 763*   ALKPHOSPHAT 209* 176* 215*   TBILIRUBIN 2.8* 1.9* 1.6*       Medications:  • ondansetron  4 mg     • MD Alert...Warfarin per Pharmacy       • carvedilol  3.125 mg     • cefTRIAXone (ROCEPHIN) IV  2 g Stopped (11/10/20 0609)   • senna-docusate  2 Tab      And   • polyethylene glycol/lytes  1 Packet      And   • magnesium hydroxide  30 mL      And   • bisacodyl  10 mg     • acetaminophen  650 mg     • Pharmacy Consult Request  1 Each      And   • oxyCODONE immediate-release  2.5 mg      And   • oxyCODONE immediate-release  5 mg      And   • morphine injection  2 mg     • nicotine  14 mg      And   • nicotine polacrilex  2 mg     • doxycycline  100 mg Stopped (11/10/20 0732)   • atorvastatin  40 mg     • prasugrel  10 mg     • omeprazole  20 mg     • furosemide  40 mg     • spironolactone  25 mg     • heparin  0-30 Units/kg/hr 20 Units/kg/hr (11/10/20 0830)   • heparin  40 Units/kg          Imaging:  US-ABDOMEN COMPLETE SURVEY   Final Result         1. Echogenic liver, most commonly hepatic steatosis.      2. Patent main portal vein but pulsatile flow.      3. Cholelithiasis. Contracted gallbladder. No pericholecystic fluid.            US-EXTREMITY VENOUS LOWER BILAT   Final Result      EC-ECHOCARDIOGRAM COMPLETE W/ CONT   Final Result      CT-CTA CHEST PULMONARY ARTERY W/ RECONS   Final Result      Left lower lobe consolidation and groundglass greater than right lower lobe groundglass. This is worrisome for atypical infection such as Covid 19. Given some central lucency in the left lower lobe, necrotizing pneumonia, septic embolus  and/or malignancy    are possible and follow-up to resolution is advised      Left worse than right heart enlargement with definite right, probable left ventricular apex intraluminal filling defects highly likely to represent thrombus. Echocardiogram is recommended to clarify      No CT evidence of pulmonary thromboembolism      DX-CHEST-PORTABLE (1 VIEW)   Final Result      Mild cardiac silhouette enlargement with some left basilar opacity that could be from consolidation, nodule or atelectasis. Follow-up to resolution is recommended          Assessment and plan    * Biventricular failure (HCC)- (present on admission)  Assessment & Plan  -Likely ischemic with recent MI, methamphetamine use may play a component  -EF showed a LVEF of 10%  -Diuresis with IV Lasix 40 mg twice daily, started patient on carvedilol 3.125 mg twice daily.  -We will hold off on ACE-I and spironolactone given RED, will add these once GFR starts improving  -Continue heparin infusion for biventricular thrombi  -Cardiology on board, appreciate their recommendations    Peptic ulcer disease- (present on admission)  Assessment & Plan  Patient reports a surgery to repair a gastric ulcer  omeprazole    Methamphetamine abuse (HCC)- (present on admission)  Assessment & Plan  Last used 3 days ago    Elevated TSH- (present on admission)  Assessment & Plan  TSH elevated at 12.7, T4 1.5 and T3 59.3   -Will hold off on starting Synthroid until acute issues are addressed    CAD (coronary artery disease)- (present on admission)  Assessment & Plan  Recent PCI in June at [facility]  Statin, antiplatelet    QT prolongation- (present on admission)  Assessment & Plan  QTc 513 on EKG  Limit QTc prolonging meds    Pneumonia- (present on admission)  Assessment & Plan  -CT PE showed left lower lobe consolidation with central cavitation, groundglass opacities in bilateral lower lung fields (left >right) and right pleural effusion  -Negative for influenza and initial  Covid test, repeat Covid test pending  -Started on ceftriaxone and doxycycline (start date 11/8)  -May need a HRCT in the future  -F/U blood cultures and sputum cultures      RED (acute kidney injury) (HCC)- (present on admission)  Assessment & Plan  Likely secondary to cardiorenal vs other.  -Unknown baseline Cr  -Strict I's/O's  -Renal function gradually improving  -Resumed spironolactone  -Renal dose meds, avoid nephrotoxins  -Renal US showed no signs of obstruction, renal calculi or masses  -Follow renal function      Elevated transaminase level- (present on admission)  Assessment & Plan  Likely secondary to congestive hepatopathy  RUQ US showed hepatic steatosis and Cholelithiasis with no signs of cholecystitis,  Hepatitis panel negative, normal ammonia levels  Limit hepatotoxins  Monitor synthetic function    Left ventricular apical thrombus- (present on admission)  Assessment & Plan  Confirmed on echo  Position from heparin drip to warfarin  Cardiology on board    Right ventricular apical thrombus- (present on admission)  Assessment & Plan  -Discontinued heparin drip and transition to warfarin  -Lower extremity ultrasound showed no signs of DVT  -At high risk for embolization with systemic thrombolysis      Pain in the chest- (present on admission)  Assessment & Plan  Epigastric/lower chest at the time of admission, currently denies pain  -Troponin trending down   -RUQ ultrasound shows hepatic steatosis and cholelithiasis without signs of cholecystitis  -Lipase normal  -Echo shows an EF of 10% with thrombi in the right and left ventricles and RVSP of 45 mmHg.   -Was started on a heparin drip and transitioned to warfarin      DISPO:  Transfer to floor     Code Status: Full    Quality Measures:  Feeding: Cardiac diet  Analgesia: Acetaminophen, oxycodone and morphine  Sedation: None  Thromboprophylaxis: on warfarin   Head of bed: >30 degrees  Ulcer prophylaxis: Omeprazole  Glycemic control: None  Bowel care:  bowel regimen  Indwelling lines: 2 Peripheral IVs  Deescalation of antibiotics: Ceftriaxone and doxycycline for CAP

## 2020-11-10 NOTE — PROGRESS NOTES
Inpatient Anticoagulation Service Note    Date: 11/10/2020    Reason for Anticoagulation: Other (Comments)(Biventricular Thrombus)   Target INR: 2.0 to 3.0         Hemoglobin Value: 16.2  Hematocrit Value: 50.3  Lab Platelet Value: 276    INR from last 7 days     Date/Time INR Value    11/10/20 0200  (!) 1.76    11/08/20 2057  (!) 1.53        Dose from last 7 days     Date/Time Dose (mg)    11/10/20 1536  2.5        Average Dose (mg): (New Start)  Significant Interactions: Statin, Antiplatelet Medications, Antibiotics, Proton Pump Inhibitor  Bridge Therapy: Yes(Heparin Weight Based protocol)  Date of Last VTE Event: 11/08/20  Bridge Therapy Start Date: 11/08/20  Days of Overlap Therapy: 0  INR Value Greater than 2 Prior to Discontinuation of Parenteral Anticoagulation: Not Applicable  Reversal Agent Administered: Not Applicable    Assessment: Patient with a history of HFrEF 10%, found to have biventricular thrombus on Echo.  Started on heparin drip due to RED, improving.  Patient has a history of non-compliance and polysubstance abuse.  His LFTs are elevated suspected to be related to CHF.  No plans for procedures, ok to start anticoagulation.  Will complete antibiotic therapy on 11/13.  All other DDI noted above will continue.  Baseline INR supra-therapeutic.  H/H stable WNL.      Plan:  Will start warfarin 2.5 mg daily and follow up INR in the AM.    Education Material Provided?: No(will need prior to discharge)  Pharmacist suggested discharge dosing: MEL Henning, Pharm.D., BCPS

## 2020-11-10 NOTE — HEART FAILURE PROGRAM
[facility], Cardiovascular Nurse Navigator HFrEF Consult Note    Patient presented to the ED on 11/8/20 with c/o epigastric pain and increased belching times one hour after eating a buffalo burger.    ERP note indicates that patient notes that he follows with [facility] cardiology and that he has a history of heart disease - remote coronary artery stenting.    Patient has been found to have an EF of 10% and thrombi in both ventricles. UDS was positive for amphetamines. He is noted to be actively using tobacco.    Per cardiology consult, patient's HF is felt to be related to methamphetamine use.    Patient is noted to have poor prognosis, palliative consult has been ordered awaiting consult, [facility]    De Harpreet or Exacerbation: de harpreet    Bedside Care Topics    · HF Education: I've added this to the education tab  · Intake documentation: needs to be q shift  · Output documentation: needs to be q shift  · Daily weight documentation: needs to be q 24 hours    Demographics pertinent to HF    · Residence: [place]  · Insurance: Medicaid  · Consider for hydralazine dinitrate?: no  · ACP docs?: none  · Code Status: full  · Any prior palliative consults?: no   Please be aware that the ACC recommends that palliative care be involved in HF care throughout the entire clinical course of HF. Also, please be aware that Palliative Care is not currently a service that is offered in the [facility] on an outpatient basis.    General HF Details    · HFrEF (10%)  · NYHA: III  · Stage: not addressed  · Etiology: as above    Important Co-Morbid Conditions    · Diabetic or newly diagnosed DM?: no  · Atrial arrhythmia?: no  · Current smoker? Yes, and no one has documented cessation    Prior PNA vaccine, we are missing influenza vaccine for current season    HFrEF Specific GDMT    · AMY - I: missing  · Evidence based BB (bisoprolol, carvedilol, or Toprol XL): carvedilol  · Aldosterone  receptor antagonist: spironolactone     HFrEF Specific Device Therapy Screening Tool n/a new diagnosis    2013 ACC/ AHA Heart Failure Guidelines  Rev date: 12/2014    Discharge planning  · Diuresis: still IV  · Discharge Plan notes: none  · Therapy notes: none  · Appointment: none yet, I've placed an order  · [facility], referred    Please see below for a comprehensive list of HF Measures.    Daily Nurse: please begin to fill out the HF checklist (pink sheet in hard chart) and use it to guide your daily care.    Discharge Nurse: please ensure completeness of the HF checklist (pink sheet in hard chart) and have it co-signed by the charge RN before the patient leaves the hospital.    Thank you, Shirley, Cardio RN Navigator [phone]    Comprehensive List of HF Measures:  1. Documentation of LV systolic function (echo or cath) PTA, during this hospitalization, or plan to assess post discharge or reason for not assessing documented  2. Documentation of fluid intake and urine output every nursing shift  3. 2 hour post diuretic assessment documented 2 hours after diuretic given  4. HF Patient Education using the Living Well With Heart Failure Booklet and Symptom Tracker documented every nursing shift  5. Nutrition consult for diet education  6. Daily weights (one weight documented every 24 hours) on a standing scale unless standing is contraindicated in which case bed scale can be used - have patient write weight on symptom tracker  7. For LVEF less than or equal to 40%, ACE-I, ARNI or ARB prescribed at discharge   8. For LVEF less than or equal to 40%, an Evidence Based Beta Blocker (bisoprolol, carvedilol, toprol xl) must be prescribed at discharge  9. For LVEF less than or equal to 35% aldosterone blockade prescribed at discharge  10. The combination of hydralazine and isosorbide dinitrate is recommended to reduce morbidity and mortality for patients self-described   Americans with NYHA class III-IV HFrEF (EF 40% or less), receiving optimal therapy with ACE inhibitors and beta blockers, unless contraindicated (Class I, GRANT: A).  11. If a HF patient is diabetic or is newly diagnosed with DM: prescribed diabetes treatment at discharge in the form of glycemic control (diet or anti-hyperglycemic medication) or f/u appointment for diabetes management scheduled at discharge.  12. If a HF patient has diabetes: prescribed lipid lowering medication at discharge  13. Documented smoking cessation advice or counseling  14. If a HF patient has a-fib: anticoagulation is prescribed upon discharge or contraindication is documented  15. Screening for and administering immunizations as long as no contraindications: Pneumonia (regardless of age) and Influenza  16. Written discharge instructions include:  ? Daily weights  ? Record weight on tracker  ? Bring tracker to appointments  ? Call MD for weight gain of 3lb /day or 5lb/week  ? HF medication teaching  ? Low sodium diet  ? Follow up appointment within seven calendar days of d/c must include: date, time and location  ? Activity  ? Worsening symptoms    What if any of the above HF measures are contraindicated?  ? Request that the discharging provider document the medication/intervention and the contraindication specifically in a progress note  ? For example: “no CHF meds due to hypotension” is not enough. It needs to say: “No ACE-I, ARNI, ARB due to hypotension”; “No Beta Blockade due to bradycardia”…

## 2020-11-10 NOTE — PROGRESS NOTES
Received signout from Dr. Avila regarding this ICU transfer.     He is a 51 yo male with CAD, HFrEF, PE, meth use who presented with SOB. Being treated for CAP. Palliative consulted.     We will begin seeing pt tomorrow.

## 2020-11-10 NOTE — CONSULTS
REFERRING PHYSICIAN: Cortez Delgado MD.     CONSULTING PHYSICIAN: Nadeem Woodruff DO.    CHIEF COMPLAINT: epigastric pain and shortness of breath    HISTORY OF PRESENT ILLNESS: The patient is a 50 y.o. male with a history of CAD requiring stents, methamphetamine use, heart failure with an ejection fraction of 10% who presented to the ED with complaints of dyspnea and epigastric pain.  It occurred after eating the day of arrival.  He denied nausea, vomiting or diarrhea.  The pain was cramping and did not radiate.  He denied chest pain but admitted to shortness of breath.  He sees cardiology at [facility] and missed a recent appointment due to this hospitalization.  He denies any other associated signs or symptoms.  No aggravating or alleviating factors.  Patient is currently in respiratory precautions due to Covid rule out.     PAST MEDICAL HISTORY:   Past Medical History:   Diagnosis Date   • HFrEF (heart failure with reduced ejection fraction) (Formerly [facility]) 11/9/2020   • Hx of appendectomy    • Left ventricular systolic dysfunction, NYHA class 3 11/9/2020       PAST SURGICAL HISTORY:   Past Surgical History:   Procedure Laterality Date   • APPENDECTOMY         FAMILY HISTORY:   No premature CAD.     SOCIAL HISTORY:   Social History     Socioeconomic History   • Marital status: Single     Spouse name: Not on file   • Number of children: Not on file   • Years of education: Not on file   • Highest education level: Not on file   Occupational History   • Not on file   Social Needs   • Financial resource strain: Not on file   • Food insecurity     Worry: Not on file     Inability: Not on file   • Transportation needs     Medical: Not on file     Non-medical: Not on file   Tobacco Use   • Smoking status: Current Every Day Smoker     Packs/day: 0.25     Types: Cigarettes   Substance and Sexual Activity   • Alcohol use: Yes     Comment: daily social drinker   • Drug use: Yes     Comment: methamphetamine   • Sexual  activity: Not on file   Lifestyle   • Physical activity     Days per week: Not on file     Minutes per session: Not on file   • Stress: Not on file   Relationships   • Social connections     Talks on phone: Not on file     Gets together: Not on file     Attends Yazdanism service: Not on file     Active member of club or organization: Not on file     Attends meetings of clubs or organizations: Not on file     Relationship status: Not on file   • Intimate partner violence     Fear of current or ex partner: Not on file     Emotionally abused: Not on file     Physically abused: Not on file     Forced sexual activity: Not on file   Other Topics Concern   • Not on file   Social History Narrative   • Not on file       ALLERGIES:   No Known Allergies     CURRENT MEDICATIONS:     Current Facility-Administered Medications:   •  carvedilol (COREG) tablet 3.125 mg, 3.125 mg, Oral, BID WITH MEALS, Jovita Gooden M.D., 3.125 mg at 11/09/20 1727  •  cefTRIAXone (ROCEPHIN) 2 g in  mL IVPB, 2 g, Intravenous, Q24HRS, Vadim Edge Jr., D.O., Stopped at 11/09/20 0624  •  senna-docusate (PERICOLACE or SENOKOT S) 8.6-50 MG per tablet 2 Tab, 2 Tab, Oral, BID, 2 Tab at 11/09/20 1727 **AND** polyethylene glycol/lytes (MIRALAX) PACKET 1 Packet, 1 Packet, Oral, QDAY PRN **AND** magnesium hydroxide (MILK OF MAGNESIA) suspension 30 mL, 30 mL, Oral, QDAY PRN **AND** bisacodyl (DULCOLAX) suppository 10 mg, 10 mg, Rectal, QDAY PRN, Vadim Edge Jr., D.O.  •  acetaminophen (Tylenol) tablet 650 mg, 650 mg, Oral, Q6HRS PRN, Vadim Edge Jr., D.O.  •  Notify provider if pain remains uncontrolled, , , CONTINUOUS **AND** Use the numeric rating scale (NRS-11) on regular floors and Critical-Care Pain Observation Tool (CPOT) on ICUs/Trauma to assess pain, , , CONTINUOUS **AND** Pulse Ox (Oximetry), , , CONTINUOUS **AND** Pharmacy Consult Request ...Pain Management Review 1 Each, 1 Each, Other, PHARMACY TO DOSE **AND** If patient  difficult to arouse and/or has respiratory depression, stop any opiates that are currently infusing and call a Rapid Response., , , CONTINUOUS **AND** oxyCODONE immediate-release (ROXICODONE) tablet 2.5 mg, 2.5 mg, Oral, Q3HRS PRN **AND** oxyCODONE immediate-release (ROXICODONE) tablet 5 mg, 5 mg, Oral, Q3HRS PRN **AND** morphine (pf) 4 mg/mL injection 2 mg, 2 mg, Intravenous, Q3HRS PRN, Vadim Edge Jr., D.O.  •  nicotine (NICODERM) 14 MG/24HR 14 mg, 14 mg, Transdermal, Daily-0600, 14 mg at 11/09/20 0553 **AND** Nicotine Replacement Patient Education Materials, , , Once **AND** nicotine polacrilex (NICORETTE) 2 MG piece 2 mg, 2 mg, Oral, Q HOUR PRN, Vadim Edge Jr., D.O.  •  doxycycline (VIBRAMYCIN) 100 mg in  mL IVPB, 100 mg, Intravenous, Q12HRS, Vadim Edge Jr., D.O., Stopped at 11/09/20 1827  •  atorvastatin (LIPITOR) tablet 40 mg, 40 mg, Oral, Nightly, Vadim Edge Jr., D.O., 40 mg at 11/09/20 0205  •  prasugrel (EFFIENT) tablet 10 mg, 10 mg, Oral, DAILY, Vadim Edge Jr., D.O., 10 mg at 11/09/20 0553  •  omeprazole (PRILOSEC) capsule 20 mg, 20 mg, Oral, DAILY, Vadim Edge Jr., D.O., 20 mg at 11/09/20 0553  •  furosemide (LASIX) injection 40 mg, 40 mg, Intravenous, BID DIURETIC, Jovita Gooden M.D., 40 mg at 11/09/20 1521  •  [START ON 11/10/2020] spironolactone (ALDACTONE) tablet 25 mg, 25 mg, Oral, DAILY, Grace Avila M.D.  •  heparin infusion 25,000 units in 500 mL 0.45% NACL, 0-30 Units/kg/hr, Intravenous, Continuous, Vadim Edge Jr., D.O., Last Rate: 32.6 mL/hr at 11/09/20 1522, 20 Units/kg/hr at 11/09/20 1522  •  heparin injection 3,300 Units, 40 Units/kg, Intravenous, PRN, Vadim Edge Jr., D.O.     LABS REVIEWED:  Lab Results   Component Value Date/Time    SODIUM 130 (L) 11/09/2020 02:30 PM    POTASSIUM 3.2 (L) 11/09/2020 02:30 PM    CHLORIDE 92 (L) 11/09/2020 02:30 PM    CO2 23 11/09/2020 02:30 PM    GLUCOSE 121 (H) 11/09/2020 02:30 PM    BUN 35 (H) 11/09/2020  "02:30 PM    CREATININE 1.27 11/09/2020 02:30 PM      Lab Results   Component Value Date/Time    PROTHROMBTM 18.9 (H) 11/08/2020 08:57 PM    INR 1.53 (H) 11/08/2020 08:57 PM      Lab Results   Component Value Date/Time    WBC 9.1 11/08/2020 06:02 PM    RBC 5.61 11/08/2020 06:02 PM    HEMOGLOBIN 16.4 11/08/2020 06:02 PM    HEMATOCRIT 50.6 11/08/2020 06:02 PM    MCV 90.2 11/08/2020 06:02 PM    MCH 29.2 11/08/2020 06:02 PM    MCHC 32.4 (L) 11/08/2020 06:02 PM    MPV 11.9 11/08/2020 06:02 PM    NEUTSPOLYS 61.50 11/08/2020 06:02 PM    LYMPHOCYTES 21.40 (L) 11/08/2020 06:02 PM    MONOCYTES 11.80 11/08/2020 06:02 PM    EOSINOPHILS 3.90 11/08/2020 06:02 PM    BASOPHILS 1.00 11/08/2020 06:02 PM        IMAGING REVIEWED AND INTERPRETED:    ECHOCARDIOGRAM:   No prior study is available for comparison.   Thrombus is  observed in the left (2.0cmX1.5cm) and right (2.6cmX1.8cm)  ventricular apex.  Severely reduced left ventricular systolic function. Left ventricular   ejection fraction is visually estimated to be 10%. Global hypokinesis.  Dilated right ventricle. Reduced right ventricular systolic function.  Moderate mitral regurgitation.  Estimated right ventricular systolic pressure is 45 mmHg.    REVIEW OF SYSTEMS:   Review of Systems   Constitutional: Negative.    HENT: Negative.    Eyes: Negative.    Respiratory: Positive for shortness of breath.    Cardiovascular: Positive for leg swelling. Negative for chest pain.   Gastrointestinal: Positive for abdominal pain.   Genitourinary: Negative.    Musculoskeletal: Negative.    Skin: Negative.    Neurological: Negative.    Endo/Heme/Allergies: Negative.    Psychiatric/Behavioral: Positive for substance abuse.     /74   Pulse 88   Temp 36.3 °C (97.4 °F) (Temporal)   Resp (!) 25   Ht 1.702 m (5' 7\")   Wt 86.4 kg (190 lb 7.6 oz)   SpO2 97%   BMI 29.83 kg/m²     PHYSICAL EXAMINATION:   Physical Exam   Constitutional: He is oriented to person, place, and time and " well-developed, well-nourished, and in no distress.   HENT:   Head: Normocephalic and atraumatic.   Eyes: Pupils are equal, round, and reactive to light.   Neck: No JVD present.   Cardiovascular: Normal rate and regular rhythm.   Pulmonary/Chest: Effort normal. He has rales.   Abdominal: Soft. Bowel sounds are normal.   Musculoskeletal:         General: Edema present.   Neurological: He is alert and oriented to person, place, and time.   Skin: Skin is warm and dry.   Psychiatric: Mood, memory and affect normal.     IMPRESSION:  Biventricular heart failure, biventricular thrombi, cardiomyopathy, Illicit drug abuse.    PLAN:  I recommend the patient continue with meidcal management. He is not a surgical candidiate for his coronary disease or the ventricular thormbi secondary to high risk status and active drug abuse.     The procedure, its risks, benefits, potential complications and alternative treatments were discussed with the patient in detail including the risks should he decide not to undergo my recommended treatment. All of his questions were answered to his satisfaction and he is willing to proceed with the operation. The risks include death, stroke,  infection: to include a rare bacterial infection related to the use of the heart/lung machine, johnson-operative myocardial infarction, dysrhythmias, diaphragmatic paralysis, chest wall paresthesia, tracheostomy, kidney or other organ failure, possible return to the operating room for bleeding, bleeding requiring transfusion with its attendant risks including AIDS or hepatitis, dehiscence of surgical incisions, respiratory complications including the need for prolonged ventilator support, Protamine or other drug reaction, peripheral neuropathy, loss of limb, and miscount of surgical items. The operative mortality risk is approximately 10%.     The STS mortality risk score is 3% and the morbidity and mortality risk score is 30%. The scores were discussed with  patient.    Sincerely,       Nadeem Woodruff DO.    I,  Nadeem Woodruff DO performed a substantial portion of the EM visit face-to-face with the same patient on the same date of service with FARAZ Jennings. I was personally involved in reviewing and interpreting the films and conducted elements of the history and physical exam. I performed all of the medical decision making for the patient.

## 2020-11-10 NOTE — PROGRESS NOTES
[facility] ICU Transfer Note                                                                                         ICU Admit Date: 11/8/2020       ICU Discharge Date:  11/10/20        Primary Diagnosis:   Biventricular failure (HCC) with biventricular thrombi and multiorgan failure        ICU Course Summary (Brief Narrative):  This is a 50-year-old male with a history of CAD s/p stenting,HFrEF, history of PE, medication noncompliance, methamphetamine and tobacco abuse and peptic ulcer disease, who presented to the ED with shortness of breath, nonproductive cough, worsening bilateral pedal edema and epigastric pain.  He was borderline hypotensive and tachycardic at the time of presentation.   Blood work-up showed troponin of 39, EKG showed sinus tachycardia and no acute ST segment changes, BNP was elevated at 6863, chest x-ray showed left lower lobe opacities.Covid testing in the ER was negative.  CT-PE showed no signs of pulmonary embolism, however it showed cardiac dilatation with thrombi in the right and left ventricles and left lower lobe consolidation with a central cavity and groundglass opacities (left >right).  Ultrasound of the lower extremity showed no signs of DVT. Cardiology was consulted.  Echocardiogram showed an EF of 10% and confirmed the presence of thrombi in both the right and left ventricles. Patient was started on IV Lasix, carvedilol and heparin infusion. He was also started on ceftriaxone + doxycycline for the treatment of CAP. Ultrasound of the abdomen showed hepatic steatosis and cholelithiasis with no signs of cholecystitis.  He has improved symptomatically over the course of his ICU stay, RED also seems to be improving and hence spironolactone was resumed.  Heparin drip was switched to warfarin on 11/10.  Of note, he was also found to have subclinical hypothyroidism with elevated TSH of 12.7, T4 1.5 and T3 59.3, however thyroid replacement was not initiated in the ICU. The prognosis of his  heart failure was discussed with the patient and a palliative care consult was placed. Patient is stable for transfer to the floor.     Important Events in the ICU:   - Central Line: No  - Intubation: No  - Pressors: No   - Palomo catheter: No  - Tube feeding: No  - Antibiotics: Ceftriaxone and doxycycline (started on 11/8) for CAP  - Other procedures: None       Labs and imaging studies to be continued with their indications:  - CBC: Not indicated  - CMP or BMP: Moderate hyponatremia, RED and transaminitis  - Magnesium: Hypokalemia and hypomagnesemia  - Phosphorus: Not indicated  - Chest X-ray: Not indicated        Things to follow:   -Sputum cultures and sensitivities  -Palliative consult  -Cardiology recommendations

## 2020-11-11 ENCOUNTER — PATIENT OUTREACH (OUTPATIENT)
Dept: HEALTH INFORMATION MANAGEMENT | Facility: OTHER | Age: 50
End: 2020-11-11

## 2020-11-11 ENCOUNTER — APPOINTMENT (OUTPATIENT)
Dept: RADIOLOGY | Facility: MEDICAL CENTER | Age: 50
DRG: 291 | End: 2020-11-11
Attending: STUDENT IN AN ORGANIZED HEALTH CARE EDUCATION/TRAINING PROGRAM
Payer: MEDICAID

## 2020-11-11 LAB
ALBUMIN SERPL BCP-MCNC: 2.9 G/DL (ref 3.2–4.9)
ALBUMIN/GLOB SERPL: 0.9 G/DL
ALP SERPL-CCNC: 167 U/L (ref 30–99)
ALT SERPL-CCNC: 664 U/L (ref 2–50)
ANION GAP SERPL CALC-SCNC: 16 MMOL/L (ref 7–16)
AST SERPL-CCNC: 372 U/L (ref 12–45)
BASOPHILS # BLD AUTO: 0.8 % (ref 0–1.8)
BASOPHILS # BLD: 0.06 K/UL (ref 0–0.12)
BILIRUB CONJ SERPL-MCNC: 0.7 MG/DL (ref 0.1–0.5)
BILIRUB INDIRECT SERPL-MCNC: 0.5 MG/DL (ref 0–1)
BILIRUB SERPL-MCNC: 1.2 MG/DL (ref 0.1–1.5)
BUN SERPL-MCNC: 42 MG/DL (ref 8–22)
CALCIUM SERPL-MCNC: 8.6 MG/DL (ref 8.5–10.5)
CHLORIDE SERPL-SCNC: 93 MMOL/L (ref 96–112)
CO2 SERPL-SCNC: 21 MMOL/L (ref 20–33)
CREAT SERPL-MCNC: 1.43 MG/DL (ref 0.5–1.4)
EKG IMPRESSION: NORMAL
EOSINOPHIL # BLD AUTO: 0.29 K/UL (ref 0–0.51)
EOSINOPHIL NFR BLD: 3.9 % (ref 0–6.9)
ERYTHROCYTE [DISTWIDTH] IN BLOOD BY AUTOMATED COUNT: 48.3 FL (ref 35.9–50)
GLOBULIN SER CALC-MCNC: 3.4 G/DL (ref 1.9–3.5)
GLUCOSE SERPL-MCNC: 118 MG/DL (ref 65–99)
HCT VFR BLD AUTO: 49.5 % (ref 42–52)
HGB BLD-MCNC: 15.6 G/DL (ref 14–18)
IMM GRANULOCYTES # BLD AUTO: 0.03 K/UL (ref 0–0.11)
IMM GRANULOCYTES NFR BLD AUTO: 0.4 % (ref 0–0.9)
INR PPP: 1.65 (ref 0.87–1.13)
LYMPHOCYTES # BLD AUTO: 2.22 K/UL (ref 1–4.8)
LYMPHOCYTES NFR BLD: 29.8 % (ref 22–41)
MCH RBC QN AUTO: 28.7 PG (ref 27–33)
MCHC RBC AUTO-ENTMCNC: 31.5 G/DL (ref 33.7–35.3)
MCV RBC AUTO: 91 FL (ref 81.4–97.8)
MONOCYTES # BLD AUTO: 0.74 K/UL (ref 0–0.85)
MONOCYTES NFR BLD AUTO: 9.9 % (ref 0–13.4)
NEUTROPHILS # BLD AUTO: 4.1 K/UL (ref 1.82–7.42)
NEUTROPHILS NFR BLD: 55.2 % (ref 44–72)
NRBC # BLD AUTO: 0.03 K/UL
NRBC BLD-RTO: 0.4 /100 WBC
PLATELET # BLD AUTO: 271 K/UL (ref 164–446)
PMV BLD AUTO: 11.6 FL (ref 9–12.9)
POTASSIUM SERPL-SCNC: 3.9 MMOL/L (ref 3.6–5.5)
PROT SERPL-MCNC: 6.3 G/DL (ref 6–8.2)
PROTHROMBIN TIME: 20 SEC (ref 12–14.6)
RBC # BLD AUTO: 5.44 M/UL (ref 4.7–6.1)
SODIUM SERPL-SCNC: 130 MMOL/L (ref 135–145)
TROPONIN T SERPL-MCNC: 37 NG/L (ref 6–19)
UFH PPP CHRO-ACNC: 0.61 IU/ML
UFH PPP CHRO-ACNC: 0.67 IU/ML
WBC # BLD AUTO: 7.4 K/UL (ref 4.8–10.8)

## 2020-11-11 PROCEDURE — 93005 ELECTROCARDIOGRAM TRACING: CPT | Performed by: INTERNAL MEDICINE

## 2020-11-11 PROCEDURE — 85520 HEPARIN ASSAY: CPT | Mod: 91

## 2020-11-11 PROCEDURE — 700102 HCHG RX REV CODE 250 W/ 637 OVERRIDE(OP): Performed by: STUDENT IN AN ORGANIZED HEALTH CARE EDUCATION/TRAINING PROGRAM

## 2020-11-11 PROCEDURE — 700111 HCHG RX REV CODE 636 W/ 250 OVERRIDE (IP): Performed by: PSYCHIATRY & NEUROLOGY

## 2020-11-11 PROCEDURE — A9270 NON-COVERED ITEM OR SERVICE: HCPCS | Performed by: PSYCHIATRY & NEUROLOGY

## 2020-11-11 PROCEDURE — 700111 HCHG RX REV CODE 636 W/ 250 OVERRIDE (IP): Performed by: INTERNAL MEDICINE

## 2020-11-11 PROCEDURE — 85610 PROTHROMBIN TIME: CPT

## 2020-11-11 PROCEDURE — 71045 X-RAY EXAM CHEST 1 VIEW: CPT

## 2020-11-11 PROCEDURE — 80053 COMPREHEN METABOLIC PANEL: CPT

## 2020-11-11 PROCEDURE — 99497 ADVNCD CARE PLAN 30 MIN: CPT | Mod: GC | Performed by: FAMILY MEDICINE

## 2020-11-11 PROCEDURE — 700102 HCHG RX REV CODE 250 W/ 637 OVERRIDE(OP): Performed by: INTERNAL MEDICINE

## 2020-11-11 PROCEDURE — 99233 SBSQ HOSP IP/OBS HIGH 50: CPT | Mod: GC | Performed by: INTERNAL MEDICINE

## 2020-11-11 PROCEDURE — 82248 BILIRUBIN DIRECT: CPT

## 2020-11-11 PROCEDURE — 99232 SBSQ HOSP IP/OBS MODERATE 35: CPT | Performed by: INTERNAL MEDICINE

## 2020-11-11 PROCEDURE — A9270 NON-COVERED ITEM OR SERVICE: HCPCS | Performed by: STUDENT IN AN ORGANIZED HEALTH CARE EDUCATION/TRAINING PROGRAM

## 2020-11-11 PROCEDURE — 85025 COMPLETE CBC W/AUTO DIFF WBC: CPT

## 2020-11-11 PROCEDURE — 84484 ASSAY OF TROPONIN QUANT: CPT

## 2020-11-11 PROCEDURE — 700105 HCHG RX REV CODE 258: Performed by: INTERNAL MEDICINE

## 2020-11-11 PROCEDURE — A9270 NON-COVERED ITEM OR SERVICE: HCPCS | Performed by: INTERNAL MEDICINE

## 2020-11-11 PROCEDURE — 770020 HCHG ROOM/CARE - TELE (206)

## 2020-11-11 PROCEDURE — 700102 HCHG RX REV CODE 250 W/ 637 OVERRIDE(OP): Performed by: PSYCHIATRY & NEUROLOGY

## 2020-11-11 PROCEDURE — 93005 ELECTROCARDIOGRAM TRACING: CPT | Performed by: STUDENT IN AN ORGANIZED HEALTH CARE EDUCATION/TRAINING PROGRAM

## 2020-11-11 PROCEDURE — 93010 ELECTROCARDIOGRAM REPORT: CPT | Performed by: INTERNAL MEDICINE

## 2020-11-11 PROCEDURE — 36415 COLL VENOUS BLD VENIPUNCTURE: CPT

## 2020-11-11 RX ORDER — FUROSEMIDE 40 MG/1
40 TABLET ORAL
Status: DISCONTINUED | OUTPATIENT
Start: 2020-11-11 | End: 2020-11-13 | Stop reason: HOSPADM

## 2020-11-11 RX ORDER — AMOXICILLIN AND CLAVULANATE POTASSIUM 875; 125 MG/1; MG/1
1 TABLET, FILM COATED ORAL EVERY 12 HOURS
Status: DISCONTINUED | OUTPATIENT
Start: 2020-11-11 | End: 2020-11-11

## 2020-11-11 RX ADMIN — DOXYCYCLINE 100 MG: 100 TABLET, FILM COATED ORAL at 06:01

## 2020-11-11 RX ADMIN — FUROSEMIDE 40 MG: 40 TABLET ORAL at 12:44

## 2020-11-11 RX ADMIN — OXYCODONE HYDROCHLORIDE 2.5 MG: 5 TABLET ORAL at 19:24

## 2020-11-11 RX ADMIN — MORPHINE SULFATE 2 MG: 4 INJECTION INTRAVENOUS at 13:55

## 2020-11-11 RX ADMIN — DOCUSATE SODIUM 50 MG AND SENNOSIDES 8.6 MG 2 TABLET: 8.6; 5 TABLET, FILM COATED ORAL at 06:01

## 2020-11-11 RX ADMIN — CEFTRIAXONE SODIUM 2 G: 2 INJECTION, POWDER, FOR SOLUTION INTRAMUSCULAR; INTRAVENOUS at 05:59

## 2020-11-11 RX ADMIN — FUROSEMIDE 40 MG: 10 INJECTION, SOLUTION INTRAMUSCULAR; INTRAVENOUS at 06:01

## 2020-11-11 RX ADMIN — SPIRONOLACTONE 25 MG: 25 TABLET ORAL at 06:02

## 2020-11-11 RX ADMIN — ONDANSETRON 4 MG: 2 INJECTION INTRAMUSCULAR; INTRAVENOUS at 06:01

## 2020-11-11 RX ADMIN — OMEPRAZOLE 20 MG: 20 CAPSULE, DELAYED RELEASE ORAL at 06:02

## 2020-11-11 RX ADMIN — PRASUGREL 10 MG: 10 TABLET, FILM COATED ORAL at 06:02

## 2020-11-11 RX ADMIN — ATORVASTATIN CALCIUM 40 MG: 40 TABLET, FILM COATED ORAL at 20:51

## 2020-11-11 RX ADMIN — CARVEDILOL 3.12 MG: 3.12 TABLET, FILM COATED ORAL at 08:20

## 2020-11-11 RX ADMIN — NICOTINE 14 MG: 14 PATCH TRANSDERMAL at 06:01

## 2020-11-11 RX ADMIN — HEPARIN SODIUM 16 UNITS/KG/HR: 5000 INJECTION, SOLUTION INTRAVENOUS at 17:21

## 2020-11-11 RX ADMIN — WARFARIN SODIUM 2.5 MG: 2.5 TABLET ORAL at 18:00

## 2020-11-11 ASSESSMENT — ENCOUNTER SYMPTOMS
COUGH: 1
FOCAL WEAKNESS: 0
TROUBLE SWALLOWING: 0
NERVOUS/ANXIOUS: 0
FATIGUE: 1
DIAPHORESIS: 0
SPUTUM PRODUCTION: 0
NAUSEA: 0
SORE THROAT: 0
DIZZINESS: 0
CHEST TIGHTNESS: 0
VOMITING: 0
CONFUSION: 0
NUMBNESS: 0
ABDOMINAL PAIN: 0
SHORTNESS OF BREATH: 0
FEVER: 0
COLOR CHANGE: 0
DIARRHEA: 0
SENSORY CHANGE: 0
BLOOD IN STOOL: 0
ABDOMINAL DISTENTION: 1
PALPITATIONS: 0
AGITATION: 0
MYALGIAS: 0
HEADACHES: 0
CHILLS: 0
COUGH: 0
CONSTIPATION: 0

## 2020-11-11 ASSESSMENT — PAIN DESCRIPTION - PAIN TYPE
TYPE: ACUTE PAIN
TYPE: ACUTE PAIN

## 2020-11-11 ASSESSMENT — FIBROSIS 4 INDEX: FIB4 SCORE: 2.72

## 2020-11-11 ASSESSMENT — LIFESTYLE VARIABLES: SUBSTANCE_ABUSE: 1

## 2020-11-11 NOTE — PROGRESS NOTES
Reason for Palliative Care Consult: Advance Care Planning    Consulted by:  Dr. Avila (hospitalist)    HPI:   Pt is a 50y man with CAD, HFrEF (EF 15% in June) & methamphetamine abuse who was admitted on 11/8/20 with an exacerbation of  biventricuar failure (EF now 10%),  thrombus in both ventricles & pneumonia. He was admitted to the ICU & treated with a heparin drip, ACE I, diuresis & Abx and transferred to the floor yesterday.     Pt says that he had a heart attack last year and has known that his heart is bad. He understands now that his heart function is even worse, and knows that he could die at any time -- this is new information and it's been hard to process.     He lives in [facility] [place] [place]/ his older brother Hilton - he moved down a few months ago from [place], where he was living with his girlfriend (they had a falling out). He was independent in all ADL's and IADL's, but says that he's gotten progressively SOB over the last months -- sometimes he feels short of breath at rest.     Pt was not taking his heart medications regularly prior to admission. He was also smoking and using meth. He is going to d/c tobacco and meth and plans on taking his medications regularly.     Pt denies pain. His SOB has gotten much better since admission - he sometimes has it after he eats, though.     transerred to [facility] on 11/10/20  Rec rev:  11/8/20 - pulmonary, Dr. Delgado    11/9/20: Cardiac surgery: Dr. Woodruff: not a candidate for CAD or ventricular thrombus 2/2 high risk status and active drug abuse. Medical management recommended.     Past medical/surgical history:   PUD   CAD - PCI in June, [facility]  HFrEF (15% in June)  Methamphetamine abuse  -used 3d before admission  Tobacco abuse - daily  QTc 513  PE      Surgery:  Appendectomy    Home meds:  Atorvastatin 20mg daily  Lasix 20mg daily  prasurgel 10mg  Spironolactone 25mg    SOC:  + tob 1/4 ppd  etOH - denies  + meth    Additional consults:   Pulmonary  Cardiology  Cardiac  "surgery      Assessment:  Neuro: no HA, focal weakness  Dyspnea:  Improved - sometimes has SOB after eating  Last BM: 11/09/20-    Pain:   denies  Depression:  Feels down about medical issues. He is future oriented,  - talked about needing to get his brother's help w/ driving, etc.   Dementia:  none    Living situation & psychosocial:  Lives with brother Hilton.     Spiritual:  Is Sikhism or spirituality important for coping with this illness?  Not discussed today  Has a  or spiritual provider visit been requested?  No    Palliative Performance Scale:  60%    Advance Directive:  none  DPOA:  none  POLST:  None      We discussed code status - pt wants to be DNR/DNI: \"if I'm gone, I'm gone.\"    Code Status:  Full --> changed to DNR/DNI after this encounter.     Problem list:  PNA - negative for influenza and COVID; on CTX and doxycycline  RED  Elevated transaminases    Outcome:  Met with Mr. Ray Introduced myself and explained the role of palliative care.     Pt has good insight into his medical condition - knows that his heart failure will get worse, and that he could die at any time. He also knows that compliance (taking meds, not smoking or using meth) will help.     He doesn't have an AD - we discussed what this entails. He knows that he wants his sister, Libby Ray, to be his DPOA - but doesn't feel ready to fill out paperwork. We discussed the health  scenarios addressed in advanced directives - pt said that if he was near death, he wouldn't want life sustaining measures - \"I don't want to prolong it when I know where it's going\" - but didn't want to fill out an advanced directive today. I encouraged him to think what constitutes quality of life for him, and to talk to his family, especially his sister Libby.       Provided therapeutic communication including acknowledgement of present difficulties throughout encounter. Provided business card with palliative care contact information and " encouraged him to call with any questions or needs.     Plan:   1) code status changed to DNR/DNI  2) encouraged pt to think about DPOA and care wishes. Pt  Indicated that he would not want life sustaining measures if close to death, and he wants his sister Libby to be his DPOA  but did not want to complete AD today. will f/u and have more discussions about advanced directives.     Recommendations:   See above    Thank you for allowing Palliative Care to participate in this patient's care. Please call our team with questions and/or additional needs.    Total visit time was 30 minutes discussing advance care planning.

## 2020-11-11 NOTE — PROGRESS NOTES
Notified by CNA of patients BP 89/67. Notified MD. If BP remains in the 80s systolic then call MD back. No new orders at this time. Will continue plan of care.

## 2020-11-11 NOTE — CONSULTS
"Please see Palliative Care \"Progress Note\" by Dr. Arabella Servin 11/11/2020 (Consult note was incorrectly filed)  "

## 2020-11-11 NOTE — PROGRESS NOTES
Bedside report received from day RN. PT is A&Ox4. PT is on 1L of O2 by NC. Tele box in place and verified by monitor room. No S/S of pain or discomfort at this time. PT is educated to use call light. Bed is in lowest and locked position. Will continue plan of care.

## 2020-11-11 NOTE — PROGRESS NOTES
Community Health Worker Intake  • Social determinates of health intake complete.    • Identified barriers to Primary Care Provider.  • Contact information provided to Matt Ray   • Has PCP appointment scheduled for 11/17/2020 @ 2:20pm   • Accepted Meds-To-Beds.   • Inpatient assessment completed.    RYAN Weinstein met with patient bedside to introduce CCM services. Patient states that he has no Primary Care Provider but is available in the afternoons to establish care. Patient is established with [facility]. Patient reports that his brother provides transportation when he has medical appointments and the patient also resorts to public transportation when necessary. Patient reports that he lives with his brother in a private residence. Patient reports no needs except for transportation.     Plan: RYAN Weinstein has scheduled patient to establish care with Radha Melchor M.D. on 11/17/2020 @ 2:20pm. This will satisfy the patients need for Primary Care. [facility] has also schedule the patient with [facility], MILO Torres on 12/4/2020 with an 8:05am check in. Unfortunately this was the soonest available appointment they had and at the moment, the office only has early morning appointments. RYAN Weinstein has provided patient with CCM contact information and asked him to reach out with any needs or concerns. [facility] has provided a few bus passes to aid in every day activities and to provide transportation to his Primary Care appointment next week. Patient has no other needs at this time but CCM will follow up with patient post hospitalization.
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
## 2020-11-12 ENCOUNTER — PATIENT OUTREACH (OUTPATIENT)
Dept: HEALTH INFORMATION MANAGEMENT | Facility: OTHER | Age: 50
End: 2020-11-12

## 2020-11-12 LAB
ALBUMIN SERPL BCP-MCNC: 2.7 G/DL (ref 3.2–4.9)
ALBUMIN/GLOB SERPL: 0.8 G/DL
ALP SERPL-CCNC: 150 U/L (ref 30–99)
ALT SERPL-CCNC: 518 U/L (ref 2–50)
ANION GAP SERPL CALC-SCNC: 14 MMOL/L (ref 7–16)
AST SERPL-CCNC: 195 U/L (ref 12–45)
BASOPHILS # BLD AUTO: 1.3 % (ref 0–1.8)
BASOPHILS # BLD: 0.08 K/UL (ref 0–0.12)
BILIRUB SERPL-MCNC: 1.1 MG/DL (ref 0.1–1.5)
BUN SERPL-MCNC: 46 MG/DL (ref 8–22)
CALCIUM SERPL-MCNC: 8.4 MG/DL (ref 8.5–10.5)
CHLORIDE SERPL-SCNC: 91 MMOL/L (ref 96–112)
CO2 SERPL-SCNC: 21 MMOL/L (ref 20–33)
CREAT SERPL-MCNC: 1.69 MG/DL (ref 0.5–1.4)
EKG IMPRESSION: NORMAL
EOSINOPHIL # BLD AUTO: 0.2 K/UL (ref 0–0.51)
EOSINOPHIL NFR BLD: 3.3 % (ref 0–6.9)
ERYTHROCYTE [DISTWIDTH] IN BLOOD BY AUTOMATED COUNT: 47.2 FL (ref 35.9–50)
GLOBULIN SER CALC-MCNC: 3.4 G/DL (ref 1.9–3.5)
GLUCOSE SERPL-MCNC: 91 MG/DL (ref 65–99)
HCT VFR BLD AUTO: 47.5 % (ref 42–52)
HGB BLD-MCNC: 15 G/DL (ref 14–18)
IMM GRANULOCYTES # BLD AUTO: 0.03 K/UL (ref 0–0.11)
IMM GRANULOCYTES NFR BLD AUTO: 0.5 % (ref 0–0.9)
INR PPP: 1.99 (ref 0.87–1.13)
LYMPHOCYTES # BLD AUTO: 2.05 K/UL (ref 1–4.8)
LYMPHOCYTES NFR BLD: 34.3 % (ref 22–41)
MAGNESIUM SERPL-MCNC: 2 MG/DL (ref 1.5–2.5)
MCH RBC QN AUTO: 28.1 PG (ref 27–33)
MCHC RBC AUTO-ENTMCNC: 31.6 G/DL (ref 33.7–35.3)
MCV RBC AUTO: 89.1 FL (ref 81.4–97.8)
MONOCYTES # BLD AUTO: 0.56 K/UL (ref 0–0.85)
MONOCYTES NFR BLD AUTO: 9.4 % (ref 0–13.4)
NEUTROPHILS # BLD AUTO: 3.06 K/UL (ref 1.82–7.42)
NEUTROPHILS NFR BLD: 51.2 % (ref 44–72)
NRBC # BLD AUTO: 0.05 K/UL
NRBC BLD-RTO: 0.8 /100 WBC
PHOSPHATE SERPL-MCNC: 3.7 MG/DL (ref 2.5–4.5)
PLATELET # BLD AUTO: 290 K/UL (ref 164–446)
PMV BLD AUTO: 11.7 FL (ref 9–12.9)
POTASSIUM SERPL-SCNC: 3.4 MMOL/L (ref 3.6–5.5)
PROT SERPL-MCNC: 6.1 G/DL (ref 6–8.2)
PROTHROMBIN TIME: 23.2 SEC (ref 12–14.6)
RBC # BLD AUTO: 5.33 M/UL (ref 4.7–6.1)
SODIUM SERPL-SCNC: 126 MMOL/L (ref 135–145)
UFH PPP CHRO-ACNC: 0.69 IU/ML
UFH PPP CHRO-ACNC: 0.71 IU/ML
WBC # BLD AUTO: 6 K/UL (ref 4.8–10.8)

## 2020-11-12 PROCEDURE — 700102 HCHG RX REV CODE 250 W/ 637 OVERRIDE(OP): Performed by: STUDENT IN AN ORGANIZED HEALTH CARE EDUCATION/TRAINING PROGRAM

## 2020-11-12 PROCEDURE — 93010 ELECTROCARDIOGRAM REPORT: CPT | Performed by: INTERNAL MEDICINE

## 2020-11-12 PROCEDURE — 85610 PROTHROMBIN TIME: CPT

## 2020-11-12 PROCEDURE — 700102 HCHG RX REV CODE 250 W/ 637 OVERRIDE(OP): Performed by: INTERNAL MEDICINE

## 2020-11-12 PROCEDURE — 99232 SBSQ HOSP IP/OBS MODERATE 35: CPT | Performed by: HOSPITALIST

## 2020-11-12 PROCEDURE — A9270 NON-COVERED ITEM OR SERVICE: HCPCS | Performed by: STUDENT IN AN ORGANIZED HEALTH CARE EDUCATION/TRAINING PROGRAM

## 2020-11-12 PROCEDURE — 80053 COMPREHEN METABOLIC PANEL: CPT

## 2020-11-12 PROCEDURE — 83735 ASSAY OF MAGNESIUM: CPT

## 2020-11-12 PROCEDURE — A9270 NON-COVERED ITEM OR SERVICE: HCPCS | Performed by: NURSE PRACTITIONER

## 2020-11-12 PROCEDURE — A9270 NON-COVERED ITEM OR SERVICE: HCPCS | Performed by: INTERNAL MEDICINE

## 2020-11-12 PROCEDURE — 700111 HCHG RX REV CODE 636 W/ 250 OVERRIDE (IP): Performed by: PSYCHIATRY & NEUROLOGY

## 2020-11-12 PROCEDURE — 84100 ASSAY OF PHOSPHORUS: CPT

## 2020-11-12 PROCEDURE — 85025 COMPLETE CBC W/AUTO DIFF WBC: CPT

## 2020-11-12 PROCEDURE — 700111 HCHG RX REV CODE 636 W/ 250 OVERRIDE (IP): Performed by: INTERNAL MEDICINE

## 2020-11-12 PROCEDURE — 97161 PT EVAL LOW COMPLEX 20 MIN: CPT

## 2020-11-12 PROCEDURE — 700102 HCHG RX REV CODE 250 W/ 637 OVERRIDE(OP): Performed by: NURSE PRACTITIONER

## 2020-11-12 PROCEDURE — 770020 HCHG ROOM/CARE - TELE (206)

## 2020-11-12 PROCEDURE — 700102 HCHG RX REV CODE 250 W/ 637 OVERRIDE(OP): Performed by: HOSPITALIST

## 2020-11-12 PROCEDURE — 82962 GLUCOSE BLOOD TEST: CPT

## 2020-11-12 PROCEDURE — 85520 HEPARIN ASSAY: CPT

## 2020-11-12 PROCEDURE — A9270 NON-COVERED ITEM OR SERVICE: HCPCS | Performed by: HOSPITALIST

## 2020-11-12 PROCEDURE — 36415 COLL VENOUS BLD VENIPUNCTURE: CPT

## 2020-11-12 RX ORDER — CHOLECALCIFEROL (VITAMIN D3) 125 MCG
5 CAPSULE ORAL NIGHTLY PRN
Status: DISCONTINUED | OUTPATIENT
Start: 2020-11-12 | End: 2020-11-13 | Stop reason: HOSPADM

## 2020-11-12 RX ORDER — WARFARIN SODIUM 2 MG/1
2 TABLET ORAL DAILY
Status: DISCONTINUED | OUTPATIENT
Start: 2020-11-12 | End: 2020-11-13 | Stop reason: HOSPADM

## 2020-11-12 RX ADMIN — ONDANSETRON 4 MG: 2 INJECTION INTRAMUSCULAR; INTRAVENOUS at 10:08

## 2020-11-12 RX ADMIN — OMEPRAZOLE 20 MG: 20 CAPSULE, DELAYED RELEASE ORAL at 05:30

## 2020-11-12 RX ADMIN — LISINOPRIL 5 MG: 20 TABLET ORAL at 05:31

## 2020-11-12 RX ADMIN — CARVEDILOL 3.12 MG: 3.12 TABLET, FILM COATED ORAL at 18:29

## 2020-11-12 RX ADMIN — HEPARIN SODIUM 16 UNITS/KG/HR: 5000 INJECTION, SOLUTION INTRAVENOUS at 13:12

## 2020-11-12 RX ADMIN — FUROSEMIDE 40 MG: 40 TABLET ORAL at 05:31

## 2020-11-12 RX ADMIN — FUROSEMIDE 40 MG: 40 TABLET ORAL at 17:11

## 2020-11-12 RX ADMIN — DOCUSATE SODIUM 50 MG AND SENNOSIDES 8.6 MG 2 TABLET: 8.6; 5 TABLET, FILM COATED ORAL at 18:29

## 2020-11-12 RX ADMIN — WARFARIN SODIUM 2 MG: 2 TABLET ORAL at 17:11

## 2020-11-12 RX ADMIN — CARVEDILOL 3.12 MG: 3.12 TABLET, FILM COATED ORAL at 09:12

## 2020-11-12 RX ADMIN — PRASUGREL 10 MG: 10 TABLET, FILM COATED ORAL at 05:30

## 2020-11-12 SDOH — ECONOMIC STABILITY: FOOD INSECURITY: WITHIN THE PAST 12 MONTHS, YOU WORRIED THAT YOUR FOOD WOULD RUN OUT BEFORE YOU GOT MONEY TO BUY MORE.: NEVER TRUE

## 2020-11-12 SDOH — ECONOMIC STABILITY: FOOD INSECURITY: WITHIN THE PAST 12 MONTHS, THE FOOD YOU BOUGHT JUST DIDN'T LAST AND YOU DIDN'T HAVE MONEY TO GET MORE.: NEVER TRUE

## 2020-11-12 SDOH — ECONOMIC STABILITY: INCOME INSECURITY: HOW HARD IS IT FOR YOU TO PAY FOR THE VERY BASICS LIKE FOOD, HOUSING, MEDICAL CARE, AND HEATING?: SOMEWHAT HARD

## 2020-11-12 SDOH — ECONOMIC STABILITY: TRANSPORTATION INSECURITY
IN THE PAST 12 MONTHS, HAS THE LACK OF TRANSPORTATION KEPT YOU FROM MEDICAL APPOINTMENTS OR FROM GETTING MEDICATIONS?: NO

## 2020-11-12 SDOH — ECONOMIC STABILITY: TRANSPORTATION INSECURITY
IN THE PAST 12 MONTHS, HAS LACK OF TRANSPORTATION KEPT YOU FROM MEETINGS, WORK, OR FROM GETTING THINGS NEEDED FOR DAILY LIVING?: YES

## 2020-11-12 ASSESSMENT — ENCOUNTER SYMPTOMS
FEVER: 0
EYE DISCHARGE: 0
STRIDOR: 0
HEADACHES: 0
BACK PAIN: 0
NERVOUS/ANXIOUS: 0
ABDOMINAL PAIN: 0
NAUSEA: 0
SHORTNESS OF BREATH: 1
COUGH: 0
CHILLS: 0
DIZZINESS: 0
DIARRHEA: 0
SPEECH CHANGE: 0
PALPITATIONS: 0

## 2020-11-12 ASSESSMENT — COGNITIVE AND FUNCTIONAL STATUS - GENERAL
MOBILITY SCORE: 21
STANDING UP FROM CHAIR USING ARMS: A LITTLE
SUGGESTED CMS G CODE MODIFIER MOBILITY: CJ
WALKING IN HOSPITAL ROOM: A LITTLE
CLIMB 3 TO 5 STEPS WITH RAILING: A LITTLE

## 2020-11-12 ASSESSMENT — GAIT ASSESSMENTS
DEVIATION: BRADYKINETIC;SHUFFLED GAIT;DECREASED BASE OF SUPPORT
DISTANCE (FEET): 200

## 2020-11-12 ASSESSMENT — PAIN DESCRIPTION - PAIN TYPE
TYPE: ACUTE PAIN

## 2020-11-12 ASSESSMENT — FIBROSIS 4 INDEX: FIB4 SCORE: 1.51

## 2020-11-12 NOTE — PROGRESS NOTES
"Transfer note:    Per Dr. Avila's note after ICU discharge  \"This is a 50-year-old male with a history of CAD s/p stenting,HFrEF, history of PE, medication noncompliance, methamphetamine and tobacco abuse and peptic ulcer disease, who presented to the ED with shortness of breath, nonproductive cough, worsening bilateral pedal edema and epigastric pain.  He was borderline hypotensive and tachycardic at the time of presentation.   Blood work-up showed troponin of 39, EKG showed sinus tachycardia and no acute ST segment changes, BNP was elevated at 6863, chest x-ray showed left lower lobe opacities.Covid testing in the ER was negative.  CT-PE showed no signs of pulmonary embolism, however it showed cardiac dilatation with thrombi in the right and left ventricles and left lower lobe consolidation with a central cavity and groundglass opacities (left >right).  Ultrasound of the lower extremity showed no signs of DVT. Cardiology was consulted.  Echocardiogram showed an EF of 10% and confirmed the presence of thrombi in both the right and left ventricles. Patient was started on IV Lasix, carvedilol and heparin infusion. He was also started on ceftriaxone + doxycycline for the treatment of CAP. Ultrasound of the abdomen showed hepatic steatosis and cholelithiasis with no signs of cholecystitis.  He has improved symptomatically over the course of his ICU stay, RED also seems to be improving and hence spironolactone was resumed.  Heparin drip was switched to warfarin on 11/10.  Of note, he was also found to have subclinical hypothyroidism with elevated TSH of 12.7, T4 1.5 and T3 59.3, however thyroid replacement was not initiated in the ICU. The prognosis of his heart failure was discussed with the patient and a palliative care consult was placed. Patient is stable for transfer to the floor.\"    Patient was euvolemic when he transferred. IV lasix switched to PO same dose. Kidney functions are improved. Transaminitis is " resolving, likely 2/2 hepatic congestion. Antibiotics were discontinued per antibiotic stewardship recommendations. The consolidation is circular in shape and likely 2/2 prior (6 weeks ago) pulmonary embolism which patient left [place] with AMA and was not on anticoagulants. Patient is likely can be discharged after warfarin was bridged and physical therapy cleared. Needs outpatient follow up with cardiology.

## 2020-11-12 NOTE — PROGRESS NOTES
Daily Progress Note:     Date of Service: 11/11/2020  Primary Team: [facility] Orange Team   Attending: Elvin Rivera M.D.   Senior Resident: Dr. Vogel  Intern: Dr. Harrell  Contact:  [phone]    Chief Complaint:   Shortness of breath, volume overload    Subjective:  No acute overnight events. Patient is feeling ok today. Complained about mild chest tightness but denied any pain. EKG is wnl. Patient denies any SOB, palpitations. No pedal edema.    Consultants/Specialty:  Cardiology  Cardiac surgery  Palliative medicine    Review of Systems:    Review of Systems   Constitutional: Negative for chills and fever.   HENT: Negative for congestion and sore throat.    Respiratory: Positive for cough. Negative for sputum production and shortness of breath.    Cardiovascular: Negative for chest pain, palpitations and leg swelling.   Gastrointestinal: Negative for abdominal pain, blood in stool, constipation, diarrhea, nausea and vomiting.   Genitourinary: Negative for dysuria and hematuria.   Musculoskeletal: Negative for myalgias.   Skin: Negative for rash.   Neurological: Negative for dizziness, sensory change, focal weakness and headaches.   Psychiatric/Behavioral: Positive for substance abuse.       Objective Data:   Physical Exam:   Vitals:   Temp:  [35.7 °C (96.3 °F)-36.3 °C (97.4 °F)] 36.1 °C (97 °F)  Pulse:  [62-77] 77  Resp:  [15-18] 16  BP: ()/(39-71) 83/55  SpO2:  [91 %-97 %] 91 %    Physical Exam  Constitutional:       Appearance: He is not ill-appearing.   HENT:      Head: Normocephalic and atraumatic.      Right Ear: External ear normal.      Left Ear: External ear normal.      Nose: Nose normal.      Mouth/Throat:      Pharynx: Oropharynx is clear.   Eyes:      Extraocular Movements: Extraocular movements intact.      Pupils: Pupils are equal, round, and reactive to light.   Cardiovascular:      Rate and Rhythm: Normal rate and regular rhythm.      Pulses: Normal pulses.   Pulmonary:      Effort:  Pulmonary effort is normal.   Abdominal:      General: Abdomen is flat. Bowel sounds are normal.   Musculoskeletal:      Right lower leg: Edema present.      Left lower leg: Edema present.   Skin:     Capillary Refill: Capillary refill takes less than 2 seconds.   Neurological:      General: No focal deficit present.      Mental Status: He is alert.      Motor: No weakness.           Labs:   Recent Labs     11/08/20  1802 11/08/20  2057 11/10/20  0200 11/11/20  0253   RBC 5.61  --  5.60 5.44   HEMOGLOBIN 16.4  --  16.2 15.6   HEMATOCRIT 50.6  --  50.3 49.5   PLATELETCT 290  --  276 271   PROTHROMBTM  --  18.9* 21.1* 20.0*   APTT  --  33.8  --   --    INR  --  1.53* 1.76* 1.65*     Recent Labs     11/09/20  1430 11/10/20  0200 11/11/20  0253   SODIUM 130* 125* 130*   POTASSIUM 3.2* 3.9 3.9   CHLORIDE 92* 92* 93*   CO2 23 20 21   BUN 35* 41* 42*   CREATININE 1.27 1.23 1.43*   MAGNESIUM  --  1.9  --    CALCIUM 8.4* 8.6 8.6     Recent Labs     11/08/20  2057 11/09/20  1430 11/10/20  0200 11/11/20  0253   ALTSGPT  --  781* 763* 664*   ASTSGOT  --  583* 511* 372*   ALKPHOSPHAT  --  176* 215* 167*   TBILIRUBIN  --  1.9* 1.6* 1.2   DBILIRUBIN  --   --  0.8* 0.7*   LIPASE 72  --   --   --    GLUCOSE  --  121* 152* 118*     Recent Labs     11/08/20  2057 11/10/20  0200 11/11/20  0253   APTT 33.8  --   --    INR 1.53* 1.76* 1.65*             Recent Labs     11/08/20  1802 11/09/20  1430 11/10/20  0200 11/11/20  0253   WBC 9.1  --  8.5 7.4   NEUTSPOLYS 61.50  --  62.40 55.20   LYMPHOCYTES 21.40*  --  22.20 29.80   MONOCYTES 11.80  --  9.70 9.90   EOSINOPHILS 3.90  --  4.30 3.90   BASOPHILS 1.00  --  0.70 0.80   ASTSGOT 685* 583* 511* 372*   ALTSGPT 847* 781* 763* 664*   ALKPHOSPHAT 209* 176* 215* 167*   TBILIRUBIN 2.8* 1.9* 1.6* 1.2     Recent Labs     11/09/20  0010   METHADONE Negative   OPIATES Negative   CANNABINOID Negative   AMPHUR Positive*       Imaging:   US-ABDOMEN COMPLETE SURVEY   Final Result         1. Echogenic  liver, most commonly hepatic steatosis.      2. Patent main portal vein but pulsatile flow.      3. Cholelithiasis. Contracted gallbladder. No pericholecystic fluid.            US-EXTREMITY VENOUS LOWER BILAT   Final Result      EC-ECHOCARDIOGRAM COMPLETE W/ CONT   Final Result      CT-CTA CHEST PULMONARY ARTERY W/ RECONS   Final Result      Left lower lobe consolidation and groundglass greater than right lower lobe groundglass. This is worrisome for atypical infection such as Covid 19. Given some central lucency in the left lower lobe, necrotizing pneumonia, septic embolus and/or malignancy    are possible and follow-up to resolution is advised      Left worse than right heart enlargement with definite right, probable left ventricular apex intraluminal filling defects highly likely to represent thrombus. Echocardiogram is recommended to clarify      No CT evidence of pulmonary thromboembolism      DX-CHEST-PORTABLE (1 VIEW)   Final Result      Mild cardiac silhouette enlargement with some left basilar opacity that could be from consolidation, nodule or atelectasis. Follow-up to resolution is recommended           * Biventricular failure (HCC)- (present on admission)  Assessment & Plan  -Likely ischemic with recent MI, methamphetamine use may play a component  -EF showed a LVEF of 10%  -Diuresis with PO Lasix 40 mg twice daily, started patient on carvedilol 3.125 mg twice daily.  -on ACE-I and spironolactone, monitor kidney functions  -Continue heparin infusion for biventricular thrombi  -Cardiology on board, appreciate their recommendations    Peptic ulcer disease- (present on admission)  Assessment & Plan  Patient reports a surgery to repair a gastric ulcer  omeprazole    Methamphetamine abuse (HCC)- (present on admission)  Assessment & Plan  Last used 3 days ago    Elevated TSH- (present on admission)  Assessment & Plan  TSH elevated at 12.7, T4 1.5 and T3 59.3   -Will hold off on starting Synthroid until acute  issues are addressed    CAD (coronary artery disease)- (present on admission)  Assessment & Plan  Recent PCI in June at [place]  Statin, antiplatelet    QT prolongation- (present on admission)  Assessment & Plan  QTc 513 on EKG  Limit QTc prolonging meds    Pneumonia- (present on admission)  Assessment & Plan  -CT PE showed left lower lobe consolidation with central cavitation, groundglass opacities in bilateral lower lung fields (left >right) and right pleural effusion  -Negative for influenza and initial Covid test, repeat Covid test pending  -Started on ceftriaxone and doxycycline (start date 11/8) stopped today. The opacities likely from prior PE, antibiotic stewardship recommended to stop abx.  -May need a HRCT in the future  -Cultures negative to date.      RED (acute kidney injury) (HCC)- (present on admission)  Assessment & Plan  Likely secondary to cardiorenal vs other.  -Unknown baseline Cr  -Strict I's/O's  -Renal function gradually improving  -Resumed spironolactone  -Renal dose meds, avoid nephrotoxins  -Renal US showed no signs of obstruction, renal calculi or masses  -Follow renal function      Elevated transaminase level- (present on admission)  Assessment & Plan  Likely secondary to congestive hepatopathy  RUQ US showed hepatic steatosis and Cholelithiasis with no signs of cholecystitis,  Hepatitis panel negative, normal ammonia levels  Limit hepatotoxins  Monitor synthetic function    Left ventricular apical thrombus- (present on admission)  Assessment & Plan  Confirmed on echo  Bridging from heparin drip to warfarin. Stop heparin once INR>2  Cardiology on board    Right ventricular apical thrombus- (present on admission)  Assessment & Plan  -Discontinued heparin drip and transition to warfarin  -Lower extremity ultrasound showed no signs of DVT  -At high risk for embolization with systemic thrombolysis      Pain in the chest- (present on admission)  Assessment & Plan  Epigastric/lower chest at  the time of admission, currently denies pain  -Troponin trending down   -RUQ ultrasound shows hepatic steatosis and cholelithiasis without signs of cholecystitis  -Lipase normal  -Echo shows an EF of 10% with thrombi in the right and left ventricles and RVSP of 45 mmHg.   -Was started on a heparin drip and transitioned to warfarin

## 2020-11-12 NOTE — PROGRESS NOTES
"[facility] Cross-Coverage Note    S:   Received page from KOKO aGllardo: Pt was having 10/10 stomach pain generalized to entire body. Also had anxiety and low BP of 80/59 -> 76/52. Had been given 2.5 mg oxy at 7:30 PM.    I went to bedside to assess pt. Pt had \"stomach pressure\" in epigastric area. Denied chest pain. He had little appetite. He was anxious because he thought he could go home today but then was told he could not go home. He said he had some shortness of breath that was the same that it was at baseline.    O:   Took VS again:  BP 77/34  MAP 40  RR 20  HR 96    Gen: Anxious, diaphoretic, laying in bed, eyes closed  CV: RRR  Pulm: CTAB  Abd: Tender to palpation in epigastric area. +BS in all quadrants  MSK: Edema present (Trace) b/l in LE  Skin: Cap refill <2 seconds    A&P:    I am concerned at this time pt may have fluid overload, given that he has HFrEF of EF of 10% (setting of biventricular HF, CAD, ongoing meth abuse with biventricular clot), especially in setting of heart strain 2/2 CHF exacerbation.    -Will obtain STAT:  -CXR  -EKG  -Troponin    If CXR shows pulmonary edema, will start on lasix.     I went over the plan with KOKO Gallardo. Also informed pt about plan.    -Continue to monitor closely    Katie Weaver MD, MPH  [facility], PGY-1      "

## 2020-11-12 NOTE — CODE DOCUMENTATION
Bedside RN discussed pain management for this patient, educated to attempt oxy versus morphine due to patient's persistent hypotension. Bedside RN comfortable with patient at this time. [facility] nurses will remain available if needed.

## 2020-11-12 NOTE — PROGRESS NOTES
"Inpatient Anticoagulation Service Note    Date: 11/12/2020    Reason for Anticoagulation: Biventricular Thrombus  Target INR: 2.0 to 3.0      Hemoglobin Value: 15  Hematocrit Value: 47.5  Lab Platelet Value: 290    INR from last 7 days     Date/Time INR Value    11/12/20 0508  (!) 1.99    11/11/20 0253  (!) 1.65    11/10/20 0200  (!) 1.76    11/08/20 2057  (!) 1.53        Dose from last 7 days     Date/Time Dose (mg)    11/12/20 1800  2    11/11/20 1800  2.5    11/10/20 1800  2.5           HPI: New start warfarin this admission for new thrombus on ECHO (11/8): Thrombus is observed in the left (2.0cmX1.5cm) and right (2.6cmX1.8cm) ventricular apex. LFTs elevated and INR elevated at baseline. Bridging to therapeutic INR with heparin drip.       Assessment: INR remains SUBtherapeutic with significant trend upward in INR today. On \"Day 2\" of heparin/warfarin bridge, yesterday was \"Day 0\". LFTs continue to trend downward. Hematology remains stable WNL.   · Potential drug-drug interactions identified with acute inpatient medications: No Major DDIs of concern.    · Potential drug-drug interactions identified with chronic home medications: Atorvastatin, Omeprazole and Prasugrel which will become established interactions with warfarin therapy as dose will be customized to patient to target INR of 2 - 3.   · Inpatient Diet:  Cardiac, 2000 mL fluid restriction      Plan: Reduce to warfarin 2 mg po daily and continue INR monitoring daily until INR stabilized in range. Pending INR trend tomorrow may need further dose reduction.     Education Material Provided?: Yes, information booklet and verbal counseling provided by clinical pharmacist on 11/12/20.      Pharmacist suggested discharge dosing: TBD, Anticipate between warfarin 1 - 2 mg PO daily with plan for INR follow-up within 72 hours of discharge.      Marisela Riley, PharmD, BCPS               "

## 2020-11-12 NOTE — PROGRESS NOTES
"Inpatient Anticoagulation Service Note    Date: 11/11/2020    Reason for Anticoagulation: Biventricular Thrombus   Target INR: 2.0 to 3.0         Hemoglobin Value: 15.6  Hematocrit Value: 49.5  Lab Platelet Value: 271    INR from last 7 days     Date/Time INR Value    11/11/20 0253  (!) 1.65    11/10/20 0200  (!) 1.76    11/08/20 2057  (!) 1.53        Dose from last 7 days     Date/Time Dose (mg)    11/11/20 1800  2.5    11/10/20 1800  2.5          HPI: New start warfarin this admission for thrombus identified on ECHO 11/8/20. LFTs elevated and INR elevated at baseline. Bridging to therapeutic INR with heparin drip.     Assessment: INR remains SUBtherapeutic and trending downward following only one dose of warfarin. On \"Day 1\" of heparin/warfarin bridge, yesterday was \"Day 0\". LFTs trending downward. Hematology is stable WNL.   • Potential drug-drug interactions identified with acute inpatient medications: No Major DDIs of concern.    • Potential drug-drug interactions identified with chronic home medications: Atorvastatin, Omeprazole and Prasugrel which will become established interactions with warfarin therapy as dose will be customized to patient to target INR of 2 - 3.   • Inpatient Diet:  Cardiac, 2000 mL fluid restriction     Plan:  Continue warfarin 2.5 mg for now. INR monitoring daily, clinical pharmacist to adjust dosing as indicated.     Education Material Provided?: No - plan for counseling prior to discharge.     Pharmacist suggested discharge dosing: MEL Riley, PharmD, BCPS              "

## 2020-11-12 NOTE — PROGRESS NOTES
Hospital Medicine Daily Progress Note    Date of Service  11/12/2020    Chief Complaint  SOB and abdominal pain    Hospital Course  50 y.o. male with a hx of CAD w/ stent, peptic ulcer disease, HFrEF (10%), prior PE dx'd 6 weeks prior to admit, methamphetamine abuse, tobacco use, admitted 11/8/2020 with shortness of breath and abdominal pain. He has been noncompliant with his medications per notes. He was found to have acute on chronic heart failure, biventricular thrombi, and pulmonary edema.    Interval Problem Update  Awake and oriented  Speech clear  No distress.  States he will go stay at his brothers  I encouraged cessation from illicit drugs and medical compliance    Consultants/Specialty  Cardiology  Palliative Care    Code Status  DNAR/DNI    Disposition  Home soon.    Review of Systems  Review of Systems   Constitutional: Negative for chills and fever.   Eyes: Negative for discharge.   Respiratory: Positive for shortness of breath. Negative for cough and stridor.    Cardiovascular: Positive for leg swelling. Negative for chest pain and palpitations.   Gastrointestinal: Negative for abdominal pain, diarrhea and nausea.   Genitourinary: Negative for dysuria and hematuria.   Musculoskeletal: Negative for back pain and joint pain.   Neurological: Negative for dizziness, speech change and headaches.   Psychiatric/Behavioral: The patient is not nervous/anxious.         Physical Exam  Temp:  [36.8 °C (98.2 °F)-37 °C (98.6 °F)] 36.9 °C (98.5 °F)  Pulse:  [66-73] 69  Resp:  [16-18] 16  BP: ()/(69-84) 105/74  SpO2:  [96 %-100 %] 97 %    Physical Exam  Vitals signs reviewed.   Constitutional:       Appearance: Normal appearance. He is not diaphoretic.   HENT:      Head: Normocephalic and atraumatic.      Nose: Nose normal.      Mouth/Throat:      Mouth: Mucous membranes are moist.      Pharynx: No oropharyngeal exudate.   Eyes:      General: Scleral icterus present.         Right eye: No discharge.         Left  eye: No discharge.      Extraocular Movements: Extraocular movements intact.      Conjunctiva/sclera: Conjunctivae normal.   Neck:      Musculoskeletal: No muscular tenderness.   Cardiovascular:      Rate and Rhythm: Normal rate and regular rhythm.      Pulses:           Radial pulses are 2+ on the right side and 2+ on the left side.        Dorsalis pedis pulses are 2+ on the right side and 2+ on the left side.      Heart sounds: No murmur.   Pulmonary:      Effort: Pulmonary effort is normal. No respiratory distress.      Breath sounds: Rhonchi and rales present. No wheezing.   Abdominal:      General: Bowel sounds are normal. There is no distension.      Palpations: Abdomen is soft.   Musculoskeletal:         General: No swelling or tenderness.      Right lower leg: Edema present.      Left lower leg: Edema present.   Lymphadenopathy:      Cervical: No cervical adenopathy.   Skin:     Coloration: Skin is not jaundiced or pale.   Neurological:      General: No focal deficit present.      Mental Status: He is alert and oriented to person, place, and time. Mental status is at baseline.      Cranial Nerves: No cranial nerve deficit.   Psychiatric:         Mood and Affect: Mood normal.         Behavior: Behavior normal.         Fluids    Intake/Output Summary (Last 24 hours) at 11/12/2020 1940  Last data filed at 11/12/2020 1800  Gross per 24 hour   Intake 1090 ml   Output 1250 ml   Net -160 ml       Laboratory  Recent Labs     11/10/20  0200 11/11/20  0253 11/12/20  0508   WBC 8.5 7.4 6.0   RBC 5.60 5.44 5.33   HEMOGLOBIN 16.2 15.6 15.0   HEMATOCRIT 50.3 49.5 47.5   MCV 89.8 91.0 89.1   MCH 28.9 28.7 28.1   MCHC 32.2* 31.5* 31.6*   RDW 47.5 48.3 47.2   PLATELETCT 276 271 290   MPV 12.1 11.6 11.7     Recent Labs     11/10/20  0200 11/11/20  0253 11/12/20  0508   SODIUM 125* 130* 126*   POTASSIUM 3.9 3.9 3.4*   CHLORIDE 92* 93* 91*   CO2 20 21 21   GLUCOSE 152* 118* 91   BUN 41* 42* 46*   CREATININE 1.23 1.43* 1.69*    CALCIUM 8.6 8.6 8.4*     Recent Labs     11/10/20  0200 11/11/20  0253 11/12/20  0508   INR 1.76* 1.65* 1.99*               Imaging  DX-CHEST-PORTABLE (1 VIEW)   Final Result      No acute cardiopulmonary abnormality.      US-ABDOMEN COMPLETE SURVEY   Final Result         1. Echogenic liver, most commonly hepatic steatosis.      2. Patent main portal vein but pulsatile flow.      3. Cholelithiasis. Contracted gallbladder. No pericholecystic fluid.            US-EXTREMITY VENOUS LOWER BILAT   Final Result      EC-ECHOCARDIOGRAM COMPLETE W/ CONT   Final Result      CT-CTA CHEST PULMONARY ARTERY W/ RECONS   Final Result      Left lower lobe consolidation and groundglass greater than right lower lobe groundglass. This is worrisome for atypical infection such as Covid 19. Given some central lucency in the left lower lobe, necrotizing pneumonia, septic embolus and/or malignancy    are possible and follow-up to resolution is advised      Left worse than right heart enlargement with definite right, probable left ventricular apex intraluminal filling defects highly likely to represent thrombus. Echocardiogram is recommended to clarify      No CT evidence of pulmonary thromboembolism      DX-CHEST-PORTABLE (1 VIEW)   Final Result      Mild cardiac silhouette enlargement with some left basilar opacity that could be from consolidation, nodule or atelectasis. Follow-up to resolution is recommended           Assessment/Plan  * Biventricular failure (HCC)- (present on admission)  Assessment & Plan  Likely ischemic with recent MI, methamphetamine use may play a component  Echo LVEF of 10%  Cardiology consulting  If medically compliant and drug free consider future AICD  Aldactone, warfarin, lisinopril, coreg    Peptic ulcer disease- (present on admission)  Assessment & Plan  Hx of a surgery to repair a gastric ulcer  omeprazole    Methamphetamine abuse (HCC)- (present on admission)  Assessment & Plan  Recent use prior to  admit  Cessation encouraged    Elevated TSH- (present on admission)  Assessment & Plan  TSH elevated at 12.7, T4: 1.5 and T3: 59.3     CAD (coronary artery disease)- (present on admission)  Assessment & Plan  Recent PCI in June at [facility]  Statin, antiplatelet    QT prolongation- (present on admission)  Assessment & Plan  QTc 513 on EKG  Limit QTc prolonging meds    Pneumonia- (present on admission)  Assessment & Plan  CT PE showed left lower lobe consolidation with central cavitation, groundglass opacities in bilateral lower lung fields (left >right) and right pleural effusion  Negative for influenza and initial Covid test, repeat Covid test pending  Started on ceftriaxone and doxycycline (start date 11/8) stopped today. The opacities likely from prior PE, antibiotic stewardship recommended to stop abx.  May need a HRCT in the future  Cultures negative to date.      RED (acute kidney injury) (HCC)- (present on admission)  Assessment & Plan  11/12 BUN:46, Cr:1.69  Monitor bmp  unknown baseline Cr  Strict I's/O's  renal US showed no signs of obstruction, renal calculi or masses    Elevated transaminase level- (present on admission)  Assessment & Plan  Likely secondary to congestive hepatopathy  RUQ US showed hepatic steatosis and Cholelithiasis with no signs of cholecystitis,  Hepatitis panel negative, normal ammonia levels    Left ventricular apical thrombus- (present on admission)  Assessment & Plan  Confirmed on echo  Bridging from heparin drip to warfarin. Stop heparin once INR>2  Cardiology on board  Order for anticoagulation clinic via  11/12    Right ventricular apical thrombus- (present on admission)  Assessment & Plan  Warfarin and heparin      Pain in the chest- (present on admission)  Assessment & Plan  Epigastric/lower chest at the time of admission, currently denies pain  Troponin trending down   RUQ ultrasound shows hepatic steatosis and cholelithiasis without signs of  cholecystitis  Lipase normal  Echo shows an EF of 10% with thrombi in the right and left ventricles and RVSP of 45 mmHg.   Was started on a heparin drip and transitioned to warfarin       VTE prophylaxis: Warfarin

## 2020-11-12 NOTE — PROGRESS NOTES
Called a Rapid Response. PT has been having consistent low blood pressures. PT has an Echo of 10%. Last one was 80/59. MD was notified. PT was given 2.5mg of Oxycodone. No new orders at this time. Rapid Response will follow-up through out the night. Will continue plan of care and round hourly on PT.

## 2020-11-13 VITALS
HEIGHT: 67 IN | RESPIRATION RATE: 18 BRPM | OXYGEN SATURATION: 95 % | TEMPERATURE: 98.2 F | HEART RATE: 74 BPM | WEIGHT: 192.24 LBS | BODY MASS INDEX: 30.17 KG/M2 | SYSTOLIC BLOOD PRESSURE: 103 MMHG | DIASTOLIC BLOOD PRESSURE: 74 MMHG

## 2020-11-13 PROBLEM — R07.9 PAIN IN THE CHEST: Status: RESOLVED | Noted: 2020-11-09 | Resolved: 2020-11-13

## 2020-11-13 PROBLEM — J18.9 PNEUMONIA: Status: RESOLVED | Noted: 2020-11-09 | Resolved: 2020-11-13

## 2020-11-13 LAB
ALBUMIN SERPL BCP-MCNC: 2.7 G/DL (ref 3.2–4.9)
ALBUMIN/GLOB SERPL: 0.8 G/DL
ALP SERPL-CCNC: 148 U/L (ref 30–99)
ALT SERPL-CCNC: 402 U/L (ref 2–50)
ANION GAP SERPL CALC-SCNC: 11 MMOL/L (ref 7–16)
AST SERPL-CCNC: 130 U/L (ref 12–45)
BACTERIA BLD CULT: NORMAL
BASOPHILS # BLD AUTO: 1.1 % (ref 0–1.8)
BASOPHILS # BLD: 0.07 K/UL (ref 0–0.12)
BILIRUB SERPL-MCNC: 1.1 MG/DL (ref 0.1–1.5)
BUN SERPL-MCNC: 38 MG/DL (ref 8–22)
CALCIUM SERPL-MCNC: 8.3 MG/DL (ref 8.5–10.5)
CHLORIDE SERPL-SCNC: 92 MMOL/L (ref 96–112)
CO2 SERPL-SCNC: 23 MMOL/L (ref 20–33)
CREAT SERPL-MCNC: 1.36 MG/DL (ref 0.5–1.4)
EOSINOPHIL # BLD AUTO: 0.22 K/UL (ref 0–0.51)
EOSINOPHIL NFR BLD: 3.4 % (ref 0–6.9)
ERYTHROCYTE [DISTWIDTH] IN BLOOD BY AUTOMATED COUNT: 46.5 FL (ref 35.9–50)
ERYTHROCYTE [DISTWIDTH] IN BLOOD BY AUTOMATED COUNT: 47.6 FL (ref 35.9–50)
GLOBULIN SER CALC-MCNC: 3.2 G/DL (ref 1.9–3.5)
GLUCOSE BLD-MCNC: 117 MG/DL (ref 65–99)
GLUCOSE SERPL-MCNC: 105 MG/DL (ref 65–99)
HCT VFR BLD AUTO: 45.5 % (ref 42–52)
HCT VFR BLD AUTO: 45.7 % (ref 42–52)
HGB BLD-MCNC: 14.9 G/DL (ref 14–18)
HGB BLD-MCNC: 15 G/DL (ref 14–18)
IMM GRANULOCYTES # BLD AUTO: 0.01 K/UL (ref 0–0.11)
IMM GRANULOCYTES NFR BLD AUTO: 0.2 % (ref 0–0.9)
INR PPP: 2.52 (ref 0.87–1.13)
INR PPP: 2.67 (ref 0.87–1.13)
LYMPHOCYTES # BLD AUTO: 2.12 K/UL (ref 1–4.8)
LYMPHOCYTES NFR BLD: 33.1 % (ref 22–41)
MCH RBC QN AUTO: 29 PG (ref 27–33)
MCH RBC QN AUTO: 29.6 PG (ref 27–33)
MCHC RBC AUTO-ENTMCNC: 32.7 G/DL (ref 33.7–35.3)
MCHC RBC AUTO-ENTMCNC: 32.8 G/DL (ref 33.7–35.3)
MCV RBC AUTO: 88.5 FL (ref 81.4–97.8)
MCV RBC AUTO: 90.1 FL (ref 81.4–97.8)
MONOCYTES # BLD AUTO: 0.67 K/UL (ref 0–0.85)
MONOCYTES NFR BLD AUTO: 10.5 % (ref 0–13.4)
NEUTROPHILS # BLD AUTO: 3.31 K/UL (ref 1.82–7.42)
NEUTROPHILS NFR BLD: 51.7 % (ref 44–72)
NRBC # BLD AUTO: 0.04 K/UL
NRBC BLD-RTO: 0.6 /100 WBC
PLATELET # BLD AUTO: 253 K/UL (ref 164–446)
PLATELET # BLD AUTO: 253 K/UL (ref 164–446)
PMV BLD AUTO: 11.2 FL (ref 9–12.9)
PMV BLD AUTO: 11.4 FL (ref 9–12.9)
POTASSIUM SERPL-SCNC: 3.4 MMOL/L (ref 3.6–5.5)
PROT SERPL-MCNC: 5.9 G/DL (ref 6–8.2)
PROTHROMBIN TIME: 28 SEC (ref 12–14.6)
PROTHROMBIN TIME: 29.2 SEC (ref 12–14.6)
RBC # BLD AUTO: 5.07 M/UL (ref 4.7–6.1)
RBC # BLD AUTO: 5.14 M/UL (ref 4.7–6.1)
SIGNIFICANT IND 70042: NORMAL
SITE SITE: NORMAL
SODIUM SERPL-SCNC: 126 MMOL/L (ref 135–145)
SOURCE SOURCE: NORMAL
UFH PPP CHRO-ACNC: 0.32 IU/ML
UFH PPP CHRO-ACNC: <0.1 IU/ML
WBC # BLD AUTO: 6.2 K/UL (ref 4.8–10.8)
WBC # BLD AUTO: 6.4 K/UL (ref 4.8–10.8)

## 2020-11-13 PROCEDURE — 700101 HCHG RX REV CODE 250: Performed by: INTERNAL MEDICINE

## 2020-11-13 PROCEDURE — 85025 COMPLETE CBC W/AUTO DIFF WBC: CPT

## 2020-11-13 PROCEDURE — 700102 HCHG RX REV CODE 250 W/ 637 OVERRIDE(OP): Performed by: INTERNAL MEDICINE

## 2020-11-13 PROCEDURE — 85520 HEPARIN ASSAY: CPT

## 2020-11-13 PROCEDURE — A9270 NON-COVERED ITEM OR SERVICE: HCPCS | Performed by: NURSE PRACTITIONER

## 2020-11-13 PROCEDURE — A9270 NON-COVERED ITEM OR SERVICE: HCPCS | Performed by: INTERNAL MEDICINE

## 2020-11-13 PROCEDURE — 80053 COMPREHEN METABOLIC PANEL: CPT

## 2020-11-13 PROCEDURE — 36415 COLL VENOUS BLD VENIPUNCTURE: CPT

## 2020-11-13 PROCEDURE — 700102 HCHG RX REV CODE 250 W/ 637 OVERRIDE(OP): Performed by: STUDENT IN AN ORGANIZED HEALTH CARE EDUCATION/TRAINING PROGRAM

## 2020-11-13 PROCEDURE — 85027 COMPLETE CBC AUTOMATED: CPT

## 2020-11-13 PROCEDURE — A9270 NON-COVERED ITEM OR SERVICE: HCPCS | Performed by: STUDENT IN AN ORGANIZED HEALTH CARE EDUCATION/TRAINING PROGRAM

## 2020-11-13 PROCEDURE — 99239 HOSP IP/OBS DSCHRG MGMT >30: CPT | Performed by: INTERNAL MEDICINE

## 2020-11-13 PROCEDURE — 85610 PROTHROMBIN TIME: CPT

## 2020-11-13 PROCEDURE — 700102 HCHG RX REV CODE 250 W/ 637 OVERRIDE(OP): Performed by: NURSE PRACTITIONER

## 2020-11-13 PROCEDURE — 700111 HCHG RX REV CODE 636 W/ 250 OVERRIDE (IP): Performed by: PSYCHIATRY & NEUROLOGY

## 2020-11-13 PROCEDURE — 99497 ADVNCD CARE PLAN 30 MIN: CPT | Mod: GC | Performed by: FAMILY MEDICINE

## 2020-11-13 RX ORDER — SPIRONOLACTONE 25 MG/1
25 TABLET ORAL DAILY
Qty: 30 TAB | Refills: 1 | Status: SHIPPED | OUTPATIENT
Start: 2020-11-14

## 2020-11-13 RX ORDER — POTASSIUM CHLORIDE 20 MEQ/1
20 TABLET, EXTENDED RELEASE ORAL ONCE
Status: COMPLETED | OUTPATIENT
Start: 2020-11-13 | End: 2020-11-13

## 2020-11-13 RX ORDER — FUROSEMIDE 40 MG/1
40 TABLET ORAL 2 TIMES DAILY
Qty: 60 TAB | Refills: 0 | Status: SHIPPED | OUTPATIENT
Start: 2020-11-13 | End: 2020-12-21

## 2020-11-13 RX ORDER — CARVEDILOL 3.12 MG/1
3.12 TABLET ORAL 2 TIMES DAILY WITH MEALS
Qty: 60 TAB | Refills: 0 | Status: SHIPPED | OUTPATIENT
Start: 2020-11-13 | End: 2020-12-21

## 2020-11-13 RX ORDER — ATORVASTATIN CALCIUM 20 MG/1
40 TABLET, FILM COATED ORAL DAILY
Qty: 30 TAB | Refills: 0 | Status: SHIPPED | OUTPATIENT
Start: 2020-11-13 | End: 2020-12-21

## 2020-11-13 RX ORDER — WARFARIN SODIUM 2 MG/1
2 TABLET ORAL ONCE
Status: COMPLETED | OUTPATIENT
Start: 2020-11-13 | End: 2020-11-13

## 2020-11-13 RX ORDER — OMEPRAZOLE 20 MG/1
20 CAPSULE, DELAYED RELEASE ORAL DAILY
Qty: 30 CAP | Refills: 0 | Status: SHIPPED | OUTPATIENT
Start: 2020-11-14 | End: 2020-12-21

## 2020-11-13 RX ORDER — WARFARIN SODIUM 2 MG/1
2 TABLET ORAL DAILY
Qty: 30 TAB | Refills: 3 | Status: SHIPPED | OUTPATIENT
Start: 2020-11-13 | End: 2020-12-21 | Stop reason: SDUPTHER

## 2020-11-13 RX ORDER — LISINOPRIL 5 MG/1
5 TABLET ORAL DAILY
Qty: 30 TAB | Refills: 0 | Status: SHIPPED | OUTPATIENT
Start: 2020-11-14 | End: 2020-12-02

## 2020-11-13 RX ORDER — PRASUGREL 10 MG/1
10 TABLET, FILM COATED ORAL DAILY
Qty: 30 TAB | Refills: 0 | Status: SHIPPED | OUTPATIENT
Start: 2020-11-14 | End: 2020-12-21 | Stop reason: RX

## 2020-11-13 RX ADMIN — LISINOPRIL 5 MG: 20 TABLET ORAL at 05:17

## 2020-11-13 RX ADMIN — POTASSIUM CHLORIDE 20 MEQ: 1500 TABLET, EXTENDED RELEASE ORAL at 05:17

## 2020-11-13 RX ADMIN — WARFARIN SODIUM 2 MG: 2 TABLET ORAL at 11:45

## 2020-11-13 RX ADMIN — Medication 5 MG: at 02:12

## 2020-11-13 RX ADMIN — CARVEDILOL 3.12 MG: 3.12 TABLET, FILM COATED ORAL at 09:22

## 2020-11-13 RX ADMIN — PRASUGREL 10 MG: 10 TABLET, FILM COATED ORAL at 05:17

## 2020-11-13 RX ADMIN — FUROSEMIDE 40 MG: 40 TABLET ORAL at 05:18

## 2020-11-13 RX ADMIN — DOCUSATE SODIUM 50 MG AND SENNOSIDES 8.6 MG 2 TABLET: 8.6; 5 TABLET, FILM COATED ORAL at 05:17

## 2020-11-13 RX ADMIN — ONDANSETRON 4 MG: 2 INJECTION INTRAMUSCULAR; INTRAVENOUS at 09:22

## 2020-11-13 RX ADMIN — OMEPRAZOLE 20 MG: 20 CAPSULE, DELAYED RELEASE ORAL at 05:17

## 2020-11-13 RX ADMIN — POLYETHYLENE GLYCOL 3350 1 PACKET: 17 POWDER, FOR SOLUTION ORAL at 09:22

## 2020-11-13 ASSESSMENT — PAIN DESCRIPTION - PAIN TYPE
TYPE: ACUTE PAIN
TYPE: ACUTE PAIN

## 2020-11-13 ASSESSMENT — PATIENT HEALTH QUESTIONNAIRE - PHQ9
2. FEELING DOWN, DEPRESSED, IRRITABLE, OR HOPELESS: NOT AT ALL
SUM OF ALL RESPONSES TO PHQ9 QUESTIONS 1 AND 2: 0
1. LITTLE INTEREST OR PLEASURE IN DOING THINGS: NOT AT ALL

## 2020-11-13 NOTE — PROGRESS NOTES
MD notified of Na 126 and K 3.4. Orders to replace potassium orally and inform day shift about sodium results as they are unchanged from day shift labs on 11/12. Orders to not restart heparin and relay information onto day shift due to INR now being therapeutic.

## 2020-11-13 NOTE — THERAPY
Physical Therapy   Initial Evaluation     Patient Name: Matt Ray  Age:  50 y.o., Sex:  male  Medical Record #: [id number]  Today's Date: 11/12/2020     Precautions: Fall Risk (EF = 10%)    Assessment  Patient is 50 y.o. male presenting acutely with SOB, epigastric pain, and worsening pedal edema. PMH includes CAD s/p stenting, HFrEF, hx of PE, meth and tobacco abuse. Pt demonstrates fair strength for fxnl mob. He demonstrates a slight impairment in balance resulting in LOB during amb however ind for balance recovery. Pt would like FWW for improved balance at ND. No further acute PT needs a this time.     Plan    Recommend Physical Therapy for Evaluation only. Patient will not be actively followed for physical therapy services at this time, however may be seen if requested by physician for 1 more visit within 30 days to address any discharge or equipment needs.    ND Equipment Recommendations: Front-Wheel Walker  Discharge Recommendations: Anticipate that the patient will have no further physical therapy needs after discharge from the hospital     Objective     11/12/20 1605   Prior Living Situation   Prior Services None   Housing / Facility 1 Story House   Steps Into Home 0   Equipment Owned None   Lives with - Patient's Self Care Capacity (Brother)   Comments Pt reports plan is to ND with his brother whose home is alcohol and drug free   Prior Level of Functional Mobility   Bed Mobility Independent   Transfer Status Independent   Ambulation Independent   Distance Ambulation (Feet) (community distances)   Assistive Devices Used None   Cognition    Comments flat affect, poor insight into deficits   Strength Lower Body   Lower Body Strength  WDL   Balance Assessment   Sitting Balance (Static) Good   Sitting Balance (Dynamic) Good   Standing Balance (Static) Fair   Standing Balance (Dynamic) Fair -   Gait Analysis   Gait Level Of Assist (SBA)   Assistive Device None   Distance (Feet) 200   Deviation  Bradykinetic;Shuffled Gait;Decreased Base Of Support   # of Stairs Climbed 0   Weight Bearing Status FWB   Bed Mobility    Supine to Sit Supervised   Sit to Supine Supervised   Functional Mobility   Bed, Chair, Wheelchair Transfer Supervised

## 2020-11-13 NOTE — PROGRESS NOTES
MD at bedside at 0150.     Bilateral IV's removed. No active bleeding noted at this time. MD instruction to apply pressure and hold heparin drip at this time. Will continue to monitor.

## 2020-11-13 NOTE — PROGRESS NOTES
"Pt found with IV insertion site bleeding. IV drip stopped, Pressure applied. Active bleeding still noted 10 minutes after pressure. MD notified. MD instruction to notify charge nurse for more \"ideas\" as patient is appropriately anti-coagulated. Pressure dressing applied with ice at site. Arm elevated. Radial pulse palpable. Will reassess in 5 minutes.  "

## 2020-11-13 NOTE — PROGRESS NOTES
Patient requesting to leave AMA. Dr. Verdugo notified. Dr. Verdugo spoke to patient. Patient still wanting to leave.   Patient refused: meds to beds, family notification, AVS, front wheel walker, and AMA paperwork  This RN wrote out follow up appointment info for patient to take home, gave last dose of coumadin 2mg, and issued a hospital provided scale for home.     PIV removed.     Patient was wheeled off unit per CNA.

## 2020-11-13 NOTE — DISCHARGE INSTRUCTIONS
Discharge Instructions    Discharged to home by car with relative. Discharged via wheelchair, hospital escort: Yes.  Special equipment needed: Walker    Be sure to schedule a follow-up appointment with your primary care doctor or any specialists as instructed.     Discharge Plan:   Influenza Vaccine Given - only chart on this line when given: Influenza Vaccine Given (See MAR)    I understand that a diet low in cholesterol, fat, and sodium is recommended for good health. Unless I have been given specific instructions below for another diet, I accept this instruction as my diet prescription.   Other diet: cardiac    Special Instructions:   HF Patient Discharge Instructions  · Monitor your weight daily, and maintain a weight chart, to track your weight changes.   · Activity as tolerated, unless your Doctor has ordered otherwise. Other activity order: as tolerated.  · Follow a low fat, low cholesterol, low salt diet unless instructed otherwise by your Doctor. Read the labels on the back of food products and track your intake of fat, cholesterol and salt.   · Fluid Restriction Yes. If a Fluid Restriction has been ordered by your Doctor, measure fluids with a measuring cup to ensure that you are not exceeding the restriction.   · No smoking.  · Oxygen No. If your Doctor has ordered that you wear Oxygen at home, it is important to wear it as ordered.  · Did you receive an explanation from staff on the importance of taking each of your medications and why it is necessary to keep taking them unless your doctor says to stop? Yes  · Were all of your questions answered about how to manage your heart failure and what to do if you have increased signs and symptoms after you go home? Yes  · Do you feel like your heart failure care team involved you in the care treatment plan and allowed you to make decisions regarding your care while in the hospital and addressed any discharge needs you might have? Yes    See the educational  handout provided at discharge for more information on monitoring your daily weight, activity and diet. This also explains more about Heart Failure, symptoms of a flare-up and some of the tests that you have undergone.     Warning Signs of a Flare-Up include:  · Swelling in the ankles or lower legs.  · Shortness of breath, while at rest, or while doing normal activities.   · Shortness of breath at night when in bed, or coughing in bed.   · Requiring more pillows to sleep at night, or needing to sit up at night to sleep.  · Feeling weak, dizzy or fatigued.     When to call your Doctor:  · Call [facility] seven days a week from 8:00 a.m. to 8:00 p.m. for medical questions [phone].  · Call your Primary Care Physician or Cardiologist if:   1. You experience any pain radiating to your jaw or neck.  2. You have any difficulty breathing.  3. You experience weight gain of 3 lbs in a day or 5 lbs in a week.   4. You feel any palpitations or irregular heartbeats.  5. You become dizzy or lose consciousness.   If you have had an angiogram or had a pacemaker or AICD placed, and experience:  1. Bleeding, drainage or swelling at the surgical / puncture site.  2. Fever greater than 100.0 F  3. Shock from internal defibrillator.  4. Cool and / or numb extremities.      · Is patient discharged on Warfarin / Coumadin?   Yes    You are receiving the drug warfarin. Please understand the importance of monitoring warfarin with scheduled PT/INR blood draws.  Follow-up with the [facility] in one week for INR lab..    IMPORTANT: HOW TO USE THIS INFORMATION:  This is a summary and does NOT have all possible information about this product. This information does not assure that this product is safe, effective, or appropriate for you. This information is not individual medical advice and does not substitute for the advice of your health care professional. Always ask your health care professional for complete information  "about this product and your specific health needs.      WARFARIN - ORAL (WARF-uh-rin)      COMMON BRAND NAME(S): Coumadin      WARNING:  Warfarin can cause very serious (possibly fatal) bleeding. This is more likely to occur when you first start taking this medication or if you take too much warfarin. To decrease your risk for bleeding, your doctor or other health care provider will monitor you closely and check your lab results (INR test) to make sure you are not taking too much warfarin. Keep all medical and laboratory appointments. Tell your doctor right away if you notice any signs of serious bleeding. See also Side Effects section.      USES:  This medication is used to treat blood clots (such as in deep vein thrombosis-DVT or pulmonary embolus-PE) and/or to prevent new clots from forming in your body. Preventing harmful blood clots helps to reduce the risk of a stroke or heart attack. Conditions that increase your risk of developing blood clots include a certain type of irregular heart rhythm (atrial fibrillation), heart valve replacement, recent heart attack, and certain surgeries (such as hip/knee replacement). Warfarin is commonly called a \"blood thinner,\" but the more correct term is \"anticoagulant.\" It helps to keep blood flowing smoothly in your body by decreasing the amount of certain substances (clotting proteins) in your blood.      HOW TO USE:  Read the Medication Guide provided by your pharmacist before you start taking warfarin and each time you get a refill. If you have any questions, ask your doctor or pharmacist. Take this medication by mouth with or without food as directed by your doctor or other health care professional, usually once a day. It is very important to take it exactly as directed. Do not increase the dose, take it more frequently, or stop using it unless directed by your doctor. Dosage is based on your medical condition, laboratory tests (such as INR), and response to treatment. " Your doctor or other health care provider will monitor you closely while you are taking this medication to determine the right dose for you. Use this medication regularly to get the most benefit from it. To help you remember, take it at the same time each day. It is important to eat a balanced, consistent diet while taking warfarin. Some foods can affect how warfarin works in your body and may affect your treatment and dose. Avoid sudden large increases or decreases in your intake of foods high in vitamin K (such as broccoli, cauliflower, cabbage, brussels sprouts, kale, spinach, and other green leafy vegetables, liver, green tea, certain vitamin supplements). If you are trying to lose weight, check with your doctor before you try to go on a diet. Cranberry products may also affect how your warfarin works. Limit the amount of cranberry juice (16 ounces/480 milliliters a day) or other cranberry products you may drink or eat.      SIDE EFFECTS:  Nausea, loss of appetite, or stomach/abdominal pain may occur. If any of these effects persist or worsen, tell your doctor or pharmacist promptly. Remember that your doctor has prescribed this medication because he or she has judged that the benefit to you is greater than the risk of side effects. Many people using this medication do not have serious side effects. This medication can cause serious bleeding if it affects your blood clotting proteins too much (shown by unusually high INR lab results). Even if your doctor stops your medication, this risk of bleeding can continue for up to a week. Tell your doctor right away if you have any signs of serious bleeding, including: unusual pain/swelling/discomfort, unusual/easy bruising, prolonged bleeding from cuts or gums, persistent/frequent nosebleeds, unusually heavy/prolonged menstrual flow, pink/dark urine, coughing up blood, vomit that is bloody or looks like coffee grounds, severe headache, dizziness/fainting, unusual or  persistent tiredness/weakness, bloody/black/tarry stools, chest pain, shortness of breath, difficulty swallowing. Tell your doctor right away if any of these unlikely but serious side effects occur: persistent nausea/vomiting, severe stomach/abdominal pain, yellowing eyes/skin. This drug rarely has caused very serious (possibly fatal) problems if its effects lead to small blood clots (usually at the beginning of treatment). This can lead to severe skin/tissue damage that may require surgery or amputation if left untreated. Patients with certain blood conditions (protein C or S deficiency) may be at greater risk. Get medical help right away if any of these rare but serious side effects occur: painful/red/purplish patches on the skin (such as on the toe, breast, abdomen), change in the amount of urine, vision changes, confusion, slurred speech, weakness on one side of the body. A very serious allergic reaction to this drug is rare. However, get medical help right away if you notice any symptoms of a serious allergic reaction, including: rash, itching/swelling (especially of the face/tongue/throat), severe dizziness, trouble breathing. This is not a complete list of possible side effects. If you notice other effects not listed above, contact your doctor or pharmacist. In the [place] - Call your doctor for medical advice about side effects. You may report side effects to FDA at 6-139-GTZ-9768. In [place] - Call your doctor for medical advice about side effects. You may report side effects to [facility] [place] at [phone].      PRECAUTIONS:  Before taking warfarin, tell your doctor or pharmacist if you are allergic to it; or if you have any other allergies. This product may contain inactive ingredients, which can cause allergic reactions or other problems. Talk to your pharmacist for more details. Before using this medication, tell your doctor or pharmacist your medical history, especially of: blood disorders (such as  anemia, hemophilia), bleeding problems (such as bleeding of the stomach/intestines, bleeding in the brain), blood vessel disorders (such as aneurysms), recent major injury/surgery, liver disease, alcohol use, mental/mood disorders (including memory problems), frequent falls/injuries. It is important that all your doctors and dentists know that you take warfarin. Before having surgery or any medical/dental procedures, tell your doctor or dentist that you are taking this medication and about all the products you use (including prescription drugs, nonprescription drugs, and herbal products). Avoid getting injections into the muscles. If you must have an injection into a muscle (for example, a flu shot), it should be given in the arm. This way, it will be easier to check for bleeding and/or apply pressure bandages. This medication may cause stomach bleeding. Daily use of alcohol while using this medicine will increase your risk for stomach bleeding and may also affect how this medication works. Limit or avoid alcoholic beverages. If you have not been eating well, if you have an illness or infection that causes fever, vomiting, or diarrhea for more than 2 days, or if you start using any antibiotic medications, contact your doctor or pharmacist immediately because these conditions can affect how warfarin works. This medication can cause heavy bleeding. To lower the chance of getting cut, bruised, or injured, use great caution with sharp objects like safety razors and nail cutters. Use an electric razor when shaving and a soft toothbrush when brushing your teeth. Avoid activities such as contact sports. If you fall or injure yourself, especially if you hit your head, call your doctor immediately. Your doctor may need to check you. The Food & Drug Administration has stated that generic warfarin products are interchangeable. However, consult your doctor or pharmacist before switching warfarin products. Be careful not to take  "more than one medication that contains warfarin unless specifically directed by the doctor or health care provider who is monitoring your warfarin treatment. Older adults may be at greater risk for bleeding while using this drug. This medication is not recommended for use during pregnancy because of serious (possibly fatal) harm to an unborn baby. Discuss the use of reliable forms of birth control with your doctor. If you become pregnant or think you may be pregnant, tell your doctor immediately. If you are planning pregnancy, discuss a plan for managing your condition with your doctor before you become pregnant. Your doctor may switch the type of medication you use during pregnancy. Very small amounts of this medication may pass into breast milk but is unlikely to harm a nursing infant. Consult your doctor before breast-feeding.      DRUG INTERACTIONS:  Drug interactions may change how your medications work or increase your risk for serious side effects. This document does not contain all possible drug interactions. Keep a list of all the products you use (including prescription/nonprescription drugs and herbal products) and share it with your doctor and pharmacist. Do not start, stop, or change the dosage of any medicines without your doctor's approval. Warfarin interacts with many prescription, nonprescription, vitamin, and herbal products. This includes medications that are applied to the skin or inside the vagina or rectum. The interactions with warfarin usually result in an increase or decrease in the \"blood-thinning\" (anticoagulant) effect. Your doctor or other health care professional should closely monitor you to prevent serious bleeding or clotting problems. While taking warfarin, it is very important to tell your doctor or pharmacist of any changes in medications, vitamins, or herbal products that you are taking. Some products that may interact with this drug include: capecitabine, imatinib, mifepristone. " Aspirin, aspirin-like drugs (salicylates), and nonsteroidal anti-inflammatory drugs (NSAIDs such as ibuprofen, naproxen, celecoxib) may have effects similar to warfarin. These drugs may increase the risk of bleeding problems if taken during treatment with warfarin. Carefully check all prescription/nonprescription product labels (including drugs applied to the skin such as pain-relieving creams) since the products may contain NSAIDs or salicylates. Talk to your doctor about using a different medication (such as acetaminophen) to treat pain/fever. Low-dose aspirin and related drugs (such as clopidogrel, ticlopidine) should be continued if prescribed by your doctor for specific medical reasons such as heart attack or stroke prevention. Consult your doctor or pharmacist for more details. Many herbal products interact with warfarin. Tell your doctor before taking any herbal products, especially bromelains, coenzyme Q10, cranberry, danshen, dong quai, fenugreek, garlic, ginkgo biloba, ginseng, and Gracy's wort, among others. This medication may interfere with a certain laboratory test to measure theophylline levels, possibly causing false test results. Make sure laboratory personnel and all your doctors know you use this drug.      OVERDOSE:  If overdose is suspected, contact a poison control center or emergency room immediately. [place] residents can call the [place] National Poison Hotline at 1-357.711.4963. [place] residents can call a provincial poison control center. Symptoms of overdose may include: bloody/black/tarry stools, pink/dark urine, unusual/prolonged bleeding.      NOTES:  Do not share this medication with others. Laboratory and/or medical tests (such as INR, complete blood count) must be performed periodically to monitor your progress or check for side effects. Consult your doctor for more details.      MISSED DOSE:  For the best possible benefit, do not miss any doses. If you do miss a dose and remember on the  same day, take it as soon as you remember. If you remember on the next day, skip the missed dose and resume your usual dosing schedule. Do not double the dose to catch up because this could increase your risk for bleeding. Keep a record of missed doses to give to your doctor or pharmacist. Contact your doctor or pharmacist if you miss 2 or more doses in a row.      STORAGE:  Store at room temperature away from light and moisture. Do not store in the bathroom. Keep all medications away from children and pets. Do not flush medications down the toilet or pour them into a drain unless instructed to do so. Properly discard this product when it is  or no longer needed. Consult your pharmacist or local waste disposal company for more details about how to safely discard your product.      MEDICAL ALERT:  Your condition and medication can cause complications in a medical emergency. For information about enrolling in MedicAlert, call 1-771.229.4799 ([place]) or 1-313.365.5955 ([place]).      Information last revised 2010 Copyright(c) 2010 [employer]             Depression / Suicide Risk    As you are discharged from this [facility] facility, it is important to learn how to keep safe from harming yourself.    Recognize the warning signs:  · Abrupt changes in personality, positive or negative- including increase in energy   · Giving away possessions  · Change in eating patterns- significant weight changes-  positive or negative  · Change in sleeping patterns- unable to sleep or sleeping all the time   · Unwillingness or inability to communicate  · Depression  · Unusual sadness, discouragement and loneliness  · Talk of wanting to die  · Neglect of personal appearance   · Rebelliousness- reckless behavior  · Withdrawal from people/activities they love  · Confusion- inability to concentrate     If you or a loved one observes any of these behaviors or has concerns about self-harm, here's what you can  do:  · Talk about it- your feelings and reasons for harming yourself  · Remove any means that you might use to hurt yourself (examples: pills, rope, extension cords, firearm)  · Get professional help from the community (Mental Health, Substance Abuse, psychological counseling)  · Do not be alone:Call your Safe Contact- someone whom you trust who will be there for you.  · Call your local CRISIS HOTLINE 440-1172 or 403-412-9718  · Call your local Children's Mobile Crisis Response Team [place] (791) 423-3595 or [employer]  · Call the toll free National Suicide Prevention Hotlines   · National Suicide Prevention Lifeline 999-050-RCEM (6069)  · National Hope Line Network 800-SUICIDE (159-6520)    Heart Failure, Diagnosis    Heart failure is a condition in which the heart has trouble pumping blood because it has become weak or stiff. This means that the heart does not pump blood well enough for the body to stay healthy. For some people with heart failure, fluid may back up into the lungs. There may also be swelling (edema) in the lower legs. Heart failure is usually a long-term (chronic) condition. It is important for you to take good care of yourself and follow the treatment plan from your health care provider.  What are the causes?  This condition may be caused by:  · High blood pressure (hypertension). Hypertension causes the heart muscle to work harder than normal. This makes the heart stiff or weak.  · Coronary artery disease, or CAD. CAD is the buildup of cholesterol and fat (plaque) in the arteries of the heart.  · Heart attack, also called myocardial infarction. This injures the heart muscle, making it hard for the heart to pump blood.  · Abnormal heart valves. The valves do not open and close properly, forcing the heart to pump harder to keep the blood flowing.  · Heart muscle disease (cardiomyopathy or myocarditis). This is damage to the heart muscle. It can increase the risk of heart  failure.  · Lung disease. The heart works harder when the lungs are not healthy.  · Abnormal heart rhythms. These can lead to heart failure.  What increases the risk?  The risk of heart failure increases as a person ages. This condition is also more likely to develop in people who:  · Are overweight.  · Are male.  · Smoke or chew tobacco.  · Abuse alcohol or illegal drugs.  · Have taken medicines that can damage the heart, such as chemotherapy drugs.  · Have diabetes.  · Have abnormal heart rhythms.  · Have thyroid problems.  · Have low blood counts (anemia).  What are the signs or symptoms?  Symptoms of this condition include:  · Shortness of breath with activity, such as when climbing stairs.  · A cough that does not go away.  · Swelling of the feet, ankles, legs, or abdomen.  · Losing weight for no reason.  · Trouble breathing when lying flat (orthopnea).  · Waking from sleep because of the need to sit up and get more air.  · Rapid heartbeat.  · Tiredness (fatigue) and loss of energy.  · Feeling light-headed, dizzy, or close to fainting.  · Loss of appetite.  · Nausea.  · Waking up more often during the night to urinate (nocturia).  · Confusion.  How is this diagnosed?  This condition is diagnosed based on:  · Your medical history, symptoms, and a physical exam.  · Diagnostic tests, which may include:  ? Echocardiogram.  ? Electrocardiogram (ECG).  ? Chest X-ray.  ? Blood tests.  ? Exercise stress test.  ? Radionuclide scans.  ? Cardiac catheterization and angiogram.  How is this treated?  Treatment for this condition is aimed at managing the symptoms of heart failure.  Medicines  Treatment may include medicines that:  · Help lower blood pressure by relaxing (dilating) the blood vessels. These medicines are called ACE inhibitors (angiotensin-converting enzyme) and ARBs (angiotensin receptor blockers).  · Cause the kidneys to remove salt and water from the blood through urination (diuretics).  · Improve heart  muscle strength and prevent the heart from beating too fast (beta blockers).  · Increase the force of the heartbeat (digoxin).  Healthy behavior changes         Treatment may also include making healthy lifestyle changes, such as:  · Reaching and staying at a healthy weight.  · Quitting smoking or chewing tobacco.  · Eating heart-healthy foods.  · Limiting or avoiding alcohol.  · Stopping the use of illegal drugs.  · Being physically active.    Other treatments  Other treatments may include:  · Procedures to open blocked arteries or repair damaged valves.  · Placing a pacemaker to improve heart function (cardiac resynchronization therapy).  · Placing a device to treat serious abnormal heart rhythms (implantable cardioverter defibrillator, or ICD).  · Placing a device to improve the pumping ability of the heart (left ventricular assist device, or LVAD).  · Receiving a healthy heart from a donor (heart transplant). This is done when other treatments have not helped.  Follow these instructions at home:  · Manage other health conditions as told by your health care provider. These may include hypertension, diabetes, thyroid disease, or abnormal heart rhythms.  · Get ongoing education and support as needed. Learn as much as you can about heart failure.  · Keep all follow-up visits as told by your health care provider. This is important.  Summary  · Heart failure is a condition in which the heart has trouble pumping blood because it has become weak or stiff.  · This condition is caused by high blood pressure and other diseases of the heart and lungs.  · Symptoms of this condition include shortness of breath, tiredness (fatigue), nausea, and swelling of the feet, ankles, legs, or abdomen.  · Treatments for this condition may include medicines, lifestyle changes, and surgery.  · Manage other health conditions as told by your health care provider.  This information is not intended to replace advice given to you by your health  care provider. Make sure you discuss any questions you have with your health care provider.  Document Released: 12/18/2006 Document Revised: 03/06/2020 Document Reviewed: 03/06/2020  [employer] Patient Education © 2020 [employer]      Heart Failure, Self Care  Heart failure is a serious condition. This sheet explains things you need to do to take care of yourself at home. To help you stay as healthy as possible, you may be asked to change your diet, take certain medicines, and make other changes in your life. Your doctor may also give you more specific instructions. If you have problems or questions, call your doctor.  What are the risks?  Having heart failure makes it more likely for you to have some problems. These problems can get worse if you do not take good care of yourself. Problems may include:  · Blood clotting problems. This may cause a stroke.  · Damage to the kidneys, liver, or lungs.  · Abnormal heart rhythms.  Supplies needed:  · Scale for weighing yourself.  · Blood pressure monitor.  · Notebook.  · Medicines.  How to care for yourself when you have heart failure  Medicines  Take over-the-counter and prescription medicines only as told by your doctor. Take your medicines every day.  · Do not stop taking your medicine unless your doctor tells you to do so.  · Do not skip any medicines.  · Get your prescriptions refilled before you run out of medicine. This is important.  Eating and drinking    · Eat heart-healthy foods. Talk with a diet specialist (dietitian) to create an eating plan.  · Choose foods that:  ? Have no trans fat.  ? Are low in saturated fat and cholesterol.  · Choose healthy foods, such as:  ? Fresh or frozen fruits and vegetables.  ? Fish.  ? Low-fat (lean) meats.  ? Legumes, such as beans, peas, and lentils.  ? Fat-free or low-fat dairy products.  ? Whole-grain foods.  ? High-fiber foods.  · Limit salt (sodium) if told by your doctor. Ask your diet specialist to tell you which  seasonings are healthy for your heart.  · Cook in healthy ways instead of frying. Healthy ways of cooking include roasting, grilling, broiling, baking, poaching, steaming, and stir-frying.  · Limit how much fluid you drink, if told by your doctor.  Alcohol use  · Do not drink alcohol if:  ? Your doctor tells you not to drink.  ? Your heart was damaged by alcohol, or you have very bad heart failure.  ? You are pregnant, may be pregnant, or are planning to become pregnant.  · If you drink alcohol:  ? Limit how much you use to:  § 0-1 drink a day for women.  § 0-2 drinks a day for men.  ? Be aware of how much alcohol is in your drink. In the [place] one drink equals one 12 oz bottle of beer (355 mL), one 5 oz glass of wine (148 mL), or one 1½ oz glass of hard liquor (44 mL).  Lifestyle    · Do not use any products that contain nicotine or tobacco, such as cigarettes, e-cigarettes, and chewing tobacco. If you need help quitting, ask your doctor.  ? Do not use nicotine gum or patches before talking to your doctor.  · Do not use illegal drugs.  · Lose weight if told by your doctor.  · Do physical activity if told by your doctor. Talk to your doctor before you begin an exercise if:  ? You are an older adult.  ? You have very bad heart failure.  · Learn to manage stress. If you need help, ask your doctor.  · Get rehab (rehabilitation) to help you stay independent and to help with your quality of life.  · Plan time to rest when you get tired.  Check weight and blood pressure    · Weigh yourself every day. This will help you to know if fluid is building up in your body.  ? Weigh yourself every morning after you pee (urinate) and before you eat breakfast.  ? Wear the same amount of clothing each time.  ? Write down your daily weight. Give your record to your doctor.  · Check and write down your blood pressure as told by your doctor.  · Check your pulse as told by your doctor.  Dealing with very hot and very cold weather  · If it  is very hot:  ? Avoid activities that take a lot of energy.  ? Use air conditioning or fans, or find a cooler place.  ? Avoid caffeine and alcohol.  ? Wear clothing that is loose-fitting, lightweight, and light-colored.  · If it is very cold:  ? Avoid activities that take a lot of energy.  ? Layer your clothes.  ? Wear mittens or gloves, a hat, and a scarf when you go outside.  ? Avoid alcohol.  Follow these instructions at home:  · Stay up to date with shots (vaccines). Get pneumococcal and flu (influenza) shots.  · Keep all follow-up visits as told by your doctor. This is important.  Contact a doctor if:  · You gain weight quickly.  · You have increasing shortness of breath.  · You cannot do your normal activities.  · You get tired easily.  · You cough a lot.  · You don't feel like eating or feel like you may vomit (nauseous).  · You become puffy (swell) in your hands, feet, ankles, or belly (abdomen).  · You cannot sleep well because it is hard to breathe.  · You feel like your heart is beating fast (palpitations).  · You get dizzy when you stand up.  Get help right away if:  · You have trouble breathing.  · You or someone else notices a change in your behavior, such as having trouble staying awake.  · You have chest pain or discomfort.  · You pass out (faint).  These symptoms may be an emergency. Do not wait to see if the symptoms will go away. Get medical help right away. Call your local emergency services (911 in the [place] Do not drive yourself to the hospital.  Summary  · Heart failure is a serious condition. To care for yourself, you may have to change your diet, take medicines, and make other lifestyle changes.  · Take your medicines every day. Do not stop taking them unless your doctor tells you to do so.  · Eat heart-healthy foods, such as fresh or frozen fruits and vegetables, fish, lean meats, legumes, fat-free or low-fat dairy products, and whole-grain or high-fiber foods.  · Ask your doctor if you  can drink alcohol. You may have to stop alcohol use if you have very bad heart failure.  · Contact your doctor if you gain weight quickly or feel that your heart is beating too fast. Get help right away if you pass out, or have chest pain or trouble breathing.  This information is not intended to replace advice given to you by your health care provider. Make sure you discuss any questions you have with your health care provider.  Document Released: 04/01/2020 Document Revised: 03/31/2020 Document Reviewed: 04/01/2020  Elsevier Patient Education © 2020 [employer]

## 2020-11-13 NOTE — PROGRESS NOTES
"Palliative Progress Note               Author: Arabella Servin D.O. Date & Time created: 2020  10:14 AM     Reason for subsequent visit: GOC    Interval History:  Pt says he feels \"OK\" - has been sitting up more and walked a few times. He denies SOB at rest. He has had a very hard time sleeping - says it's hard to sleep in the hospital. He also hasn't had a BM since admission.     He's kept in touch with his family - his common law wife in [place] and brother. He is going to live with his brother after discharge. He still wants his sister Libby to be his medical DPOA - she is homeless. She calls him whenever she has access to wifi - he can't really reach her.     Pt wants to leave today - \"I can't stay here.\" He may leave AMA if not discharged soon.     Review of Systems:  ROS   Physical Exam:    Recent vital signs  Temp (24hrs), Av.4 °C (97.5 °F), Min:35.8 °C (96.4 °F), Max:36.9 °C (98.5 °F)  Temperature: 36.8 °C (98.2 °F)  Pulse  Av.5  Min: 60  Max: 110   Blood Pressure: 103/74       Physical Exam  Recent Labs     20  0253 20  0508 20  0140   SODIUM 130* 126* 126*   POTASSIUM 3.9 3.4* 3.4*   CHLORIDE 93* 91* 92*   CO2 21 21 23   GLUCOSE 118* 91 105*   BUN 42* 46* 38*   CREATININE 1.43* 1.69* 1.36   CALCIUM 8.6 8.4* 8.3*     Recent Labs     20  0508 20  0140 20  0511   WBC 6.0 6.4 6.2   RBC 5.33 5.07 5.14   HEMOGLOBIN 15.0 15.0 14.9   HEMATOCRIT 47.5 45.7 45.5   MCV 89.1 90.1 88.5   MCH 28.1 29.6 29.0   MCHC 31.6* 32.8* 32.7*   RDW 47.2 47.6 46.5   PLATELETCT 290 253 253   MPV 11.7 11.2 11.4       Imaging/Procedures Review:      <PALLIATIVEREVIEW>  Problem List:  1) HFrEF - (10^), likely 2/2 meth abuse  2) biventricular thrombii - heparin ggt has been d/c'd and is now in warfarin.   3) hyponatremia - Na 126  4) insomnia - on melatonin 5mg QHS PRN - took at 2A today.   5) constipation: no BM since admission;   Has PRN PEG (took this AM), MOM (not used) and " "dulcolax suppository (not used.         Assessment/Plan:  We discussed options for treating insomnia (increasing melatonin to 10mg QHS and taking it at least 2h prior to bedtime) and constipation (rec using dulcolax suppository, consider starting scheduled Senna), but pt didn't want to make changes. He really just wants to go home. We discussed importance of getting medical issues sorted out before discharge, and my concern that he'd be rehospitalized if he leaves too early - pt said \"I just need to leave, that's all.\"    Pt agreed to fill out AD paperwork naming his sister Libby as medical DPOA. We don't have a reliable way of contacting her (she is homeless and calls him when able), so I recommend naming an alternate, but he declined.     1) Advanced Directive completed today - naming DPOA only. He does not want fill out care wishes now.      Code Status: DNR/DNI      20  minutes spent with greater than 50% spent counseling and coordinating the patient's care regarding ACP, medical issues & symptoms.   Please refer to HPI and assessment/plan for details.           "

## 2020-11-13 NOTE — PROGRESS NOTES
Assumed care of patient. Pt is A+O x4. No chest pain or SOB. No active bleeding noted. Informed of safety and call system. rhythm is SR. Pt reporting abd pain due to constipation and requesting morphine. Bed in low and locked position. Call light within reach. Pt educated on POC. All needs met at this time.

## 2020-11-13 NOTE — DISCHARGE SUMMARY
Discharge Summary    CHIEF COMPLAINT ON ADMISSION  Chief Complaint   Patient presents with   • Epigastric Pain       Reason for Admission  abdominal pain     Admission Date  11/8/2020    CODE STATUS  DNAR/DNI    HPI & HOSPITAL COURSE  This is a 50 y.o. male here with shortness of breath.     51 yo male hx of CAD w/ stent, peptic ulcer disease, HFrEF (10%), prior PE dx'd 6 weeks prior to admit, methamphetamine abuse, tobacco use, admitted 11/8/2020 with shortness of breath and abdominal pain. He had been noncompliant with his medications per notes.  On admission CTA was done that showed left lower lobe consolidation and groundglass opacities worrisome for atypical infection, left worse than right heart enlargement probable left ventricular apex intraluminal filling defects highly likely to represent thrombus, and no evidence of pulmonary thromboembolism.  Echocardiogram was done that showed thrombus in the left and right ventricular apex with severely reduced LVEF at 10% with global hypokinesis, moderate mitral regurg, and RVSP 45 mmHg.  He was found to have acute on chronic heart failure, biventricular thrombi, and pulmonary edema.  Patient started on anticoagulation and admitted to ICU and cardiology was consulted.  Patient found to be in acute CHF exacerbation started on IV Lasix.  Also started on antibiotics for pneumonia. Cardiac surgery was consulted and deemed patient not a surgical candidate due to high risk status and active drug abuse and recommended medical management. Extensive counseling given on drug abuse cessation. Patient downgraded to the resident service. Palliative care was consulted and status changed to DNR/DNI and declined completion of advanced directive. CT chest was done that showed small lobulated mass/consolidation and antibiotic microbial stewardship advised no abx. Recommend repeat CT chest in 4-6 weeks for follow up on the lesion. [facility] hospitalist group took over care due to COVID  restructuring during pandemic. Patient started on Warfarin and was therapeutic this morning with INR 2.5. Patient wishing to leave AGAINST MEDICAL ADVICE, despite risk of not completing anticoagulation bridging therapy. Explained to the patient at length risk of not being on AC including stroke and death and risk of not getting checked in the anticoagulation clinic. Patient left AGAINST MEDICAL ADVICE prior to anticoagulation clinic being set up. This was still arranged after he left and the patient was called. All medications sent to patient's pharmacy as he did not want to wait for meds to be filled.     Therefore, he is discharged in fair and stable condition against medcial advice.    The patient met 2-midnight criteria for an inpatient stay at the time of discharge.    Discharge Date  11/13/2020    FOLLOW UP ITEMS POST DISCHARGE  Follow up with anticoagulation clinic   Follow up with primary care physician  Follow up with cardiology for continued heart failure management.     DISCHARGE DIAGNOSES  Principal Problem:    Biventricular failure (HCC) POA: Yes  Active Problems:    Right ventricular apical thrombus POA: Yes    Left ventricular apical thrombus POA: Yes    Elevated transaminase level POA: Yes    RED (acute kidney injury) (HCC) POA: Yes    QT prolongation POA: Yes    CAD (coronary artery disease) POA: Yes    Elevated TSH POA: Yes    Methamphetamine abuse (HCC) POA: Yes    Peptic ulcer disease POA: Yes    HFrEF (heart failure with reduced ejection fraction) (HCC) POA: Unknown    Left ventricular systolic dysfunction, NYHA class 3 POA: Unknown  Resolved Problems:    Pain in the chest POA: Yes    Pneumonia POA: Yes      FOLLOW UP  Future Appointments   Date Time Provider Department Center   11/16/2020  9:15 AM [facility] EXAM 1 [facility] None   11/17/2020  2:20 PM Radha Melchor M.D. [facility], located within [facility]     María Mancuso, P.FERNANDO  [address]  [address]  [place] [address]  [phone]    Go on 12/4/2020  Please  check in at 8:05am with [facility] for a follow up. Thank you!     [address] [address] [phone]  Schedule an appointment as soon as possible for a visit  Please call to make an appointment to establish care with a primary care physician.       MEDICATIONS ON DISCHARGE     Medication List      START taking these medications      Instructions   carvedilol 3.125 MG Tabs  Commonly known as: COREG   Take 1 Tab by mouth 2 times a day, with meals.  Dose: 3.125 mg     lisinopril 5 MG Tabs  Start taking on: November 14, 2020  Commonly known as: PRINIVIL   Take 1 Tab by mouth every day.  Dose: 5 mg     omeprazole 20 MG delayed-release capsule  Start taking on: November 14, 2020  Commonly known as: PRILOSEC   Take 1 Cap by mouth every day.  Dose: 20 mg     warfarin 2 MG Tabs  Commonly known as: COUMADIN   Take 1 Tab by mouth every day at 6 PM.  Dose: 2 mg        CHANGE how you take these medications      Instructions   atorvastatin 20 MG Tabs  What changed: when to take this  Commonly known as: LIPITOR   Take 2 Tabs by mouth every day.  Dose: 40 mg     furosemide 40 MG Tabs  What changed:   · medication strength  · how much to take  · when to take this  Commonly known as: LASIX   Take 1 Tab by mouth 2 Times a Day.  Dose: 40 mg        CONTINUE taking these medications      Instructions   prasugrel 10 MG Tabs  Start taking on: November 14, 2020  Commonly known as: EFFIENT   Take 1 Tab by mouth every day.  Dose: 10 mg     spironolactone 25 MG Tabs  Start taking on: November 14, 2020  Commonly known as: ALDACTONE   Take 1 Tab by mouth every day.  Dose: 25 mg            Allergies  No Known Allergies    DIET  Orders Placed This Encounter   Procedures   • Diet Order Diet: Cardiac; Fluid modifications: (optional): 2000 ml Fluid Restriction     Standing Status:   Standing     Number of Occurrences:   1     Order Specific Question:   Diet:     Answer:   Cardiac [6]      Order Specific Question:   Fluid modifications: (optional)     Answer:   2000 ml Fluid Restriction [11]       ACTIVITY  As tolerated.  Weight bearing as tolerated    CONSULTATIONS  Cardiology   Pulmonary/ Critical Care   Cardiac Surgery   Palliative Care     PROCEDURES  N/A    LABORATORY  Lab Results   Component Value Date    SODIUM 126 (L) 11/13/2020    POTASSIUM 3.4 (L) 11/13/2020    CHLORIDE 92 (L) 11/13/2020    CO2 23 11/13/2020    GLUCOSE 105 (H) 11/13/2020    BUN 38 (H) 11/13/2020    CREATININE 1.36 11/13/2020        Lab Results   Component Value Date    WBC 6.2 11/13/2020    HEMOGLOBIN 14.9 11/13/2020    HEMATOCRIT 45.5 11/13/2020    PLATELETCT 253 11/13/2020        Total time of the discharge process exceeds 45 minutes.

## 2020-11-14 LAB
BACTERIA BLD CULT: NORMAL
SIGNIFICANT IND 70042: NORMAL
SITE SITE: NORMAL
SOURCE SOURCE: NORMAL

## 2020-11-16 ENCOUNTER — PATIENT OUTREACH (OUTPATIENT)
Dept: HEALTH INFORMATION MANAGEMENT | Facility: OTHER | Age: 50
End: 2020-11-16

## 2020-11-16 NOTE — PROGRESS NOTES
[facility]W V[facility] called patient via mobile phone in an attempt to follow up post hospitalization on 11/16/2020, 11/19/2020, and 11/23/2020. Patient did not answer and has a voicemail box that is unable to receive messages. CCM will discharge patient from services at this time as [facility] is unable to contact the patient.

## 2020-11-17 ENCOUNTER — OFFICE VISIT (OUTPATIENT)
Dept: MEDICAL GROUP | Facility: MEDICAL CENTER | Age: 50
End: 2020-11-17
Attending: FAMILY MEDICINE
Payer: MEDICAID

## 2020-11-17 VITALS
DIASTOLIC BLOOD PRESSURE: 70 MMHG | HEIGHT: 67 IN | OXYGEN SATURATION: 98 % | HEART RATE: 85 BPM | WEIGHT: 195.5 LBS | SYSTOLIC BLOOD PRESSURE: 126 MMHG | BODY MASS INDEX: 30.69 KG/M2 | TEMPERATURE: 97.7 F | RESPIRATION RATE: 16 BRPM

## 2020-11-17 DIAGNOSIS — Z12.11 SCREENING FOR COLON CANCER: ICD-10-CM

## 2020-11-17 DIAGNOSIS — R10.13 EPIGASTRIC PAIN: ICD-10-CM

## 2020-11-17 DIAGNOSIS — F41.1 GAD (GENERALIZED ANXIETY DISORDER): ICD-10-CM

## 2020-11-17 DIAGNOSIS — I50.82 BIVENTRICULAR FAILURE (HCC): ICD-10-CM

## 2020-11-17 DIAGNOSIS — F32.2 CURRENT SEVERE EPISODE OF MAJOR DEPRESSIVE DISORDER WITHOUT PSYCHOTIC FEATURES WITHOUT PRIOR EPISODE (HCC): ICD-10-CM

## 2020-11-17 DIAGNOSIS — Z23 NEED FOR VACCINATION: ICD-10-CM

## 2020-11-17 DIAGNOSIS — N17.9 AKI (ACUTE KIDNEY INJURY) (HCC): ICD-10-CM

## 2020-11-17 DIAGNOSIS — I51.3: ICD-10-CM

## 2020-11-17 DIAGNOSIS — I51.3 LEFT VENTRICULAR APICAL THROMBUS: ICD-10-CM

## 2020-11-17 DIAGNOSIS — F15.10 METHAMPHETAMINE ABUSE (HCC): ICD-10-CM

## 2020-11-17 DIAGNOSIS — I50.20 HFREF (HEART FAILURE WITH REDUCED EJECTION FRACTION) (HCC): ICD-10-CM

## 2020-11-17 DIAGNOSIS — Z00.00 HEALTHCARE MAINTENANCE: ICD-10-CM

## 2020-11-17 PROCEDURE — 99214 OFFICE O/P EST MOD 30 MIN: CPT | Performed by: FAMILY MEDICINE

## 2020-11-17 PROCEDURE — 90715 TDAP VACCINE 7 YRS/> IM: CPT

## 2020-11-17 PROCEDURE — 99204 OFFICE O/P NEW MOD 45 MIN: CPT | Mod: 25 | Performed by: FAMILY MEDICINE

## 2020-11-17 PROCEDURE — 99203 OFFICE O/P NEW LOW 30 MIN: CPT | Performed by: FAMILY MEDICINE

## 2020-11-17 PROCEDURE — 90715 TDAP VACCINE 7 YRS/> IM: CPT | Performed by: FAMILY MEDICINE

## 2020-11-17 RX ORDER — BUSPIRONE HYDROCHLORIDE 5 MG/1
5 TABLET ORAL 2 TIMES DAILY
Qty: 60 TAB | Refills: 2 | Status: SHIPPED | OUTPATIENT
Start: 2020-11-17 | End: 2020-12-21 | Stop reason: SDUPTHER

## 2020-11-17 RX ORDER — POLYETHYLENE GLYCOL 3350 17 G/17G
17 POWDER, FOR SOLUTION ORAL DAILY
Qty: 507 G | Refills: 2 | Status: SHIPPED | OUTPATIENT
Start: 2020-11-17 | End: 2020-11-17

## 2020-11-17 RX ORDER — TRAZODONE HYDROCHLORIDE 50 MG/1
50 TABLET ORAL
Qty: 30 TAB | Refills: 3 | Status: SHIPPED | OUTPATIENT
Start: 2020-11-17 | End: 2020-12-21 | Stop reason: SDUPTHER

## 2020-11-17 RX ORDER — POLYETHYLENE GLYCOL 3350 17 G/17G
17 POWDER, FOR SOLUTION ORAL DAILY
Qty: 507 G | Refills: 2 | Status: SHIPPED | OUTPATIENT
Start: 2020-11-17

## 2020-11-17 SDOH — HEALTH STABILITY: MENTAL HEALTH: HOW OFTEN DO YOU HAVE A DRINK CONTAINING ALCOHOL?: NEVER

## 2020-11-17 ASSESSMENT — ANXIETY QUESTIONNAIRES
GAD7 TOTAL SCORE: 21
4. TROUBLE RELAXING: NEARLY EVERY DAY
5. BEING SO RESTLESS THAT IT IS HARD TO SIT STILL: NEARLY EVERY DAY
2. NOT BEING ABLE TO STOP OR CONTROL WORRYING: NEARLY EVERY DAY
1. FEELING NERVOUS, ANXIOUS, OR ON EDGE: NEARLY EVERY DAY
7. FEELING AFRAID AS IF SOMETHING AWFUL MIGHT HAPPEN: NEARLY EVERY DAY
3. WORRYING TOO MUCH ABOUT DIFFERENT THINGS: NEARLY EVERY DAY
6. BECOMING EASILY ANNOYED OR IRRITABLE: NEARLY EVERY DAY

## 2020-11-17 ASSESSMENT — PATIENT HEALTH QUESTIONNAIRE - PHQ9
5. POOR APPETITE OR OVEREATING: 3 - NEARLY EVERY DAY
SUM OF ALL RESPONSES TO PHQ QUESTIONS 1-9: 23
CLINICAL INTERPRETATION OF PHQ2 SCORE: 6

## 2020-11-17 ASSESSMENT — FIBROSIS 4 INDEX: FIB4 SCORE: 1.31

## 2020-11-17 NOTE — ASSESSMENT & PLAN NOTE
Severe anxiety  Mostly from this recent diagnosis    JOHN APUL-7 Questionnaire    Feeling nervous, anxious, or on edge: Nearly every day  Not being able to sop or control worrying: Nearly every day  Worrying too much about different things: Nearly every day  Trouble relaxing: Nearly every day  Being so restless that it's hard to sit still: Nearly every day  Becoming easily annoyed or irritable: Nearly every day  Feeling afraid as if something awful might happen: Nearly every day  Total: 21

## 2020-11-17 NOTE — PROGRESS NOTES
Subjective:     CC:    Chief Complaint   Patient presents with   • Anxiety   • Sleep Problem   • Establish Care   • Other     Biventricular failure        HISTORY OF THE PRESENT ILLNESS: Patient is a 50 y.o. male. This pleasant patient is here today to establish care and discuss the following issues.   No prior PCP. Hasn't been seen in 30 years.    Right ventricular apical thrombus  On warfarin 2mg daily  Missed anticoag appt yesterday  No bleeding issues  No sob, chest pain    Current severe episode of major depressive disorder without psychotic features without prior episode (HCC)  Difficulty with sleep  No history, mostly after this most recent hospital admission  No SI/HI, no AVH  Feels more anxiety than depression    Depression Screening    Little interest or pleasure in doing things?  3 - nearly every day   Feeling down, depressed , or hopeless? 3 - nearly every day   Trouble falling or staying asleep, or sleeping too much?  3 - nearly every day   Feeling tired or having little energy?  3 - nearly every day   Poor appetite or overeating?  3 - nearly every day   Feeling bad about yourself - or that you are a failure or have let yourself or your family down? 3 - nearly every day   Trouble concentrating on things, such as reading the newspaper or watching television? 3 - nearly every day   Moving or speaking so slowly that other people could have noticed.  Or the opposite - being so fidgety or restless that you have been moving around a lot more than usual?  2 - more than half the days   Thoughts that you would be better off dead, or of hurting yourself?  0 - not at all   Patient Health Questionnaire Score: 23         JOHN PAUL (generalized anxiety disorder)  Severe anxiety  Mostly from this recent diagnosis    JOHN PAUL-7 Questionnaire    Feeling nervous, anxious, or on edge: Nearly every day  Not being able to sop or control worrying: Nearly every day  Worrying too much about different things: Nearly every day  Trouble  relaxing: Nearly every day  Being so restless that it's hard to sit still: Nearly every day  Becoming easily annoyed or irritable: Nearly every day  Feeling afraid as if something awful might happen: Nearly every day  Total: 21            Epigastric pain  Early satiety, no pain with food. Does also have some pain  Did have some distension  Better after BM, has been constipated    HFrEF (heart failure with reduced ejection fraction) ([facility])  EF 10%  Biventricular failure  On bb, ace, aldactone/lasix. Per notes likely 2/2 meth abuse, CAD  Has been taking meds as prescribed  No SOB, CP  Might have a cards appt at [facility] 12/4? Info is from discharge summary  He thinks probably drinking more than 1.5L a day  Edema is better than it was before, but still gets abdominal distension  Does not add salt to his food - mostly home cooked food, niece also has heart problems and so they are both eating heart healthy diet.         Allergies: Patient has no known allergies.    Current Outpatient Medications Ordered in Epic   Medication Sig Dispense Refill   • traZODone (DESYREL) 50 MG Tab Take 1 Tab by mouth every bedtime. 30 Tab 3   • busPIRone (BUSPAR) 5 MG tablet Take 1 Tab by mouth 2 times a day. 60 Tab 2   • polyethylene glycol 3350 (MIRALAX) 17 GM/SCOOP Powder Take 17 g by mouth every day. 507 g 2   • atorvastatin (LIPITOR) 20 MG Tab Take 2 Tabs by mouth every day. 30 Tab 0   • furosemide (LASIX) 40 MG Tab Take 1 Tab by mouth 2 Times a Day. 60 Tab 0   • prasugrel (EFFIENT) 10 MG Tab Take 1 Tab by mouth every day. 30 Tab 0   • spironolactone (ALDACTONE) 25 MG Tab Take 1 Tab by mouth every day. 30 Tab 1   • carvedilol (COREG) 3.125 MG Tab Take 1 Tab by mouth 2 times a day, with meals. 60 Tab 0   • lisinopril (PRINIVIL) 5 MG Tab Take 1 Tab by mouth every day. 30 Tab 0   • warfarin (COUMADIN) 2 MG Tab Take 1 Tab by mouth every day at 6 PM. 30 Tab 3   • omeprazole (PRILOSEC) 20 MG delayed-release capsule Take 1 Cap by mouth every  "day. 30 Cap 0     No current Epic-ordered facility-administered medications on file.        Past Medical History:   Diagnosis Date   • Anxiety    • Depression    • HFrEF (heart failure with reduced ejection fraction) ([facility]) 2020   • Hx of appendectomy    • Hypertension    • Left ventricular systolic dysfunction, NYHA class 3 2020   • Peptic ulcer 2017       Past Surgical History:   Procedure Laterality Date   • APPENDECTOMY         Social History     Tobacco Use   • Smoking status: Former Smoker     Packs/day: 0.25     Years: 30.00     Pack years: 7.50     Types: Cigarettes     Quit date: 2020     Years since quittin.0   • Smokeless tobacco: Never Used   Substance Use Topics   • Alcohol use: Not Currently     Frequency: Never     Comment: used to drink a beer a day before    • Drug use: Not Currently     Types: Methamphetamines, Inhaled     Comment: last used meth 3 weeks ago       Social History     Social History Narrative   • Not on file       Family History   Problem Relation Age of Onset   • Heart Disease Mother         MI       Health Maintenance: Many items completed, will complete address at next visit    ROS:   Gen: no fevers/chills  Pulm: no sob, no cough  CV: Positive for lower extremity edema  GI: Positive for abdominal pain          Objective:     Exam: /70 (BP Location: Right arm, Patient Position: Sitting, BP Cuff Size: Adult long)   Pulse 85   Temp 36.5 °C (97.7 °F) (Temporal)   Resp 16   Ht 1.702 m (5' 7\")   Wt 88.7 kg (195 lb 8 oz)   SpO2 98%  Body mass index is 30.62 kg/m².    General: Normal appearing. No distress.  Head: normocephalic  Eyes:  Eyes conjunctiva clear lids without ptosis, pupils equal and reactive to light accommodation  ENT: Ears normal shape and contour, canals are clear bilaterally, tympanic membranes are benign, oropharynx is without erythema, edema or exudates.   Neck: Supple. Thyroid is not enlarged.  Pulmonary: Clear to ausculation.  Normal " effort. No rales, ronchi, or wheezing.  Cardiovascular: Regular rate and rhythm without murmur. Radial pulses are intact and equal bilaterally. 2+ pitting edema up to knees b/l. (+) JVD  Abdomen: Soft, nontender, mildly distended. Normal bowel sounds.  Neurologic: no facial droop, gait normal  Lymph: No cervical or supraclavicular lymph nodes are palpable  Skin: Warm and dry.  No obvious lesions.  Musculoskeletal: Normal gait. No extremity cyanosis, clubbing  Psych: Anxious mood and affect. Alert and oriented x3. Judgment and insight is normal.      Labs:   11/13/2020-CBC normal, CMP with elevated LFTs, alk phos.  Hyponatremia 126, hypokalemic 3.4    Assessment & Plan:   50 y.o. male with the following -    1. HFrEF (heart failure with reduced ejection fraction) (HCC)  - REFERRAL TO CARDIOLOGY  - Comp Metabolic Panel; Future  Patient does seem to be fluid overloaded today.  Patient is agreeable to trying 1.5 L fluid restriction.  I will see him again next week to reevaluate, may need to increase on diuretics.    2. Biventricular failure (HCC)  - REFERRAL TO CARDIOLOGY  - Comp Metabolic Panel; Future  As #1    3. Right ventricular apical thrombus  - REFERRAL TO CARDIOLOGY  - CBC WITHOUT DIFFERENTIAL; Future  - Prothrombin Time; Future  Patient missed his appointment yesterday for anticoagulation clinic.  Another was made for tomorrow, patient is agreeable to going, importance emphasized on monitoring INR.  I have ordered an INR just in case he does not go so that we can keep an eye on it.    4. Left ventricular apical thrombus  - REFERRAL TO CARDIOLOGY  As #3    5. Current severe episode of major depressive disorder without psychotic features without prior episode ([facility])  - traZODone (DESYREL) 50 MG Tab; Take 1 Tab by mouth every bedtime.  Dispense: 30 Tab; Refill: 3  - REFERRAL TO PSYCHOLOGY  Patient has depression and anxiety, anxiety predominant.  He does need help with sleep, so I have started trazodone, also  willing to try therapy.      6. JOHN PAUL (generalized anxiety disorder)  - busPIRone (BUSPAR) 5 MG tablet; Take 1 Tab by mouth 2 times a day.  Dispense: 60 Tab; Refill: 2  - REFERRAL TO PSYCHOLOGY  He is very anxious in clinic and I have discussed and counseled him on breathing exercises, meditation.  Medications as above.    7. Epigastric pain  - polyethylene glycol 3350 (MIRALAX) 17 GM/SCOOP Powder; Take 17 g by mouth every day.  Dispense: 507 g; Refill: 2  Improved with bowel movement, will start MiraLAX on making sure he is regular.  Could also be ascitic fluid due to fluid overload, fluid restriction as #1    8. Screening for colon cancer  - OCCULT BLOOD,FECAL,IMMUNOASSAY    9. Methamphetamine abuse (HCC)  Patient has been abstinent since leaving from the hospital.  Again counseled patient on the importance of not doing meth    10. RED (acute kidney injury) (HCC)  Recheck CMP    11. Need for vaccination  - Tdap =>6yo IM        Return in about 1 week (around 11/24/2020).    Please note that this dictation was created using voice recognition software. I have made every reasonable attempt to correct obvious errors, but I expect that there are errors of grammar and possibly content that I did not discover before finalizing the note.

## 2020-11-17 NOTE — ASSESSMENT & PLAN NOTE
Early satiety, no pain with food. Does also have some pain  Did have some distension  Better after BM, has been constipated

## 2020-11-17 NOTE — ASSESSMENT & PLAN NOTE
Difficulty with sleep  No history, mostly after this most recent hospital admission  No SI/HI, no AVH  Feels more anxiety than depression    Depression Screening    Little interest or pleasure in doing things?  3 - nearly every day   Feeling down, depressed , or hopeless? 3 - nearly every day   Trouble falling or staying asleep, or sleeping too much?  3 - nearly every day   Feeling tired or having little energy?  3 - nearly every day   Poor appetite or overeating?  3 - nearly every day   Feeling bad about yourself - or that you are a failure or have let yourself or your family down? 3 - nearly every day   Trouble concentrating on things, such as reading the newspaper or watching television? 3 - nearly every day   Moving or speaking so slowly that other people could have noticed.  Or the opposite - being so fidgety or restless that you have been moving around a lot more than usual?  2 - more than half the days   Thoughts that you would be better off dead, or of hurting yourself?  0 - not at all   Patient Health Questionnaire Score: 23

## 2020-11-17 NOTE — PATIENT INSTRUCTIONS
ONEAL Torres.  [address]  [address]  [facility] [address]  [phone]     Go on 12/4/2020  Please check in at 8:05am with [facility] for a follow up      Go to youtube and look up breathing/relaxation/meditation exercises

## 2020-11-17 NOTE — ASSESSMENT & PLAN NOTE
Has been taking meds as prescribed  No SOB, CP  Might have a cards appt at [facility] 12/4? Info is from discharge summary  He thinks probably drinking more than 1.5L a day  Edema is better than it was before  Does not add salt to his food - mostly home cooked food, niece also has heart problems and so they are both eating heart healthy diet.

## 2020-11-18 NOTE — ASSESSMENT & PLAN NOTE
EF 10%  Biventricular failure  On bb, ace, aldactone/lasix. Per notes likely 2/2 meth abuse, CAD  Has been taking meds as prescribed  No SOB, CP  Might have a cards appt at [facility] 12/4? Info is from discharge summary  He thinks probably drinking more than 1.5L a day  Edema is better than it was before, but still gets abdominal distension  Does not add salt to his food - mostly home cooked food, niece also has heart problems and so they are both eating heart healthy diet.

## 2020-11-19 ENCOUNTER — TELEPHONE (OUTPATIENT)
Dept: VASCULAR LAB | Facility: MEDICAL CENTER | Age: 50
End: 2020-11-19

## 2020-11-19 NOTE — TELEPHONE ENCOUNTER
Attempted to reach pt regarding anticoaguation appointment. He missed appt yesterday for initial warfarin.     VM box is full, unable to reach pt.     Carmel Yen, PaxtonD

## 2020-11-23 ENCOUNTER — TELEPHONE (OUTPATIENT)
Dept: VASCULAR LAB | Facility: MEDICAL CENTER | Age: 50
End: 2020-11-23

## 2020-11-23 NOTE — LETTER
Matt Ray  [address] [facility] [address]    11/23/20      Dear Matt Ray,    We have been unsuccessful in our attempts to contact you regarding your Anticoagulation Service appointments.  Warfarin is a potent blood-thinning agent that requires frequent monitoring to measure its level in the blood.  If your level becomes too high, you could develop serious, sometimes life-threatening bleeding problems.  If your level becomes too low, life-threatening blood clots or stroke could result.     The last recorded INR that we have on file for you is:  INR   Date Value Ref Range Status   11/13/2020 2.52 (H) 0.87 - 1.13 Final     Comment:     INR - Non-therapeutic Reference Range: 0.87-1.13  INR - Therapeutic Reference Range: 2.0-4.0       No results found for: POCINR     To monitor your warfarin effectively, we need to be able to communicate with you.  This is a requirement to be followed by our Service.  If we are unable to contact you through repeated occasions, you are at risk of being discharged from the Anticoagulation Service.     It is extremely important that you have your lab work drawn as soon as possible.  Alternatively, you may schedule an appointment for a fingerstick INR at our clinic.  We are open Monday-Friday 8 am until 5 pm.  You may reach our Service at [phone].        Sincerely,           Cornell Rhoades, PharmD, [facility]  Clinic Supervisor  [facility]  Outpatient Anticoagulation Service

## 2020-11-23 NOTE — TELEPHONE ENCOUNTER
Attempted to reach pt regarding anticoaguation appointment. He missed appts on 11/18 AND 11/16 for initial warfarin.      VM box is full, unable to reach pt.   Will send letter     Chitra Yap PharmD

## 2020-11-29 ENCOUNTER — HOSPITAL ENCOUNTER (EMERGENCY)
Facility: MEDICAL CENTER | Age: 50
End: 2020-11-30
Attending: EMERGENCY MEDICINE
Payer: MEDICAID

## 2020-11-29 ENCOUNTER — APPOINTMENT (OUTPATIENT)
Dept: RADIOLOGY | Facility: MEDICAL CENTER | Age: 50
End: 2020-11-29
Attending: EMERGENCY MEDICINE
Payer: MEDICAID

## 2020-11-29 DIAGNOSIS — R18.8 OTHER ASCITES: ICD-10-CM

## 2020-11-29 DIAGNOSIS — N18.9 CHRONIC KIDNEY DISEASE, UNSPECIFIED CKD STAGE: ICD-10-CM

## 2020-11-29 DIAGNOSIS — Z86.79 HISTORY OF CHF (CONGESTIVE HEART FAILURE): ICD-10-CM

## 2020-11-29 DIAGNOSIS — R06.02 SOB (SHORTNESS OF BREATH): ICD-10-CM

## 2020-11-29 LAB
ALBUMIN SERPL BCP-MCNC: 3.5 G/DL (ref 3.2–4.9)
ALBUMIN/GLOB SERPL: 1 G/DL
ALP SERPL-CCNC: 145 U/L (ref 30–99)
ALT SERPL-CCNC: 55 U/L (ref 2–50)
ANION GAP SERPL CALC-SCNC: 15 MMOL/L (ref 7–16)
APPEARANCE UR: ABNORMAL
AST SERPL-CCNC: 49 U/L (ref 12–45)
BACTERIA #/AREA URNS HPF: ABNORMAL /HPF
BASOPHILS # BLD AUTO: 1.1 % (ref 0–1.8)
BASOPHILS # BLD: 0.07 K/UL (ref 0–0.12)
BILIRUB SERPL-MCNC: 1.3 MG/DL (ref 0.1–1.5)
BILIRUB UR QL STRIP.AUTO: ABNORMAL
BUN SERPL-MCNC: 42 MG/DL (ref 8–22)
CALCIUM SERPL-MCNC: 9.1 MG/DL (ref 8.4–10.2)
CHLORIDE SERPL-SCNC: 102 MMOL/L (ref 96–112)
CO2 SERPL-SCNC: 17 MMOL/L (ref 20–33)
COLOR UR: ABNORMAL
CREAT SERPL-MCNC: 1.45 MG/DL (ref 0.5–1.4)
EOSINOPHIL # BLD AUTO: 0.14 K/UL (ref 0–0.51)
EOSINOPHIL NFR BLD: 2.2 % (ref 0–6.9)
EPI CELLS #/AREA URNS HPF: ABNORMAL /HPF
ERYTHROCYTE [DISTWIDTH] IN BLOOD BY AUTOMATED COUNT: 50.7 FL (ref 35.9–50)
GLOBULIN SER CALC-MCNC: 3.4 G/DL (ref 1.9–3.5)
GLUCOSE SERPL-MCNC: 115 MG/DL (ref 65–99)
GLUCOSE UR STRIP.AUTO-MCNC: NEGATIVE MG/DL
HCT VFR BLD AUTO: 51.3 % (ref 42–52)
HGB BLD-MCNC: 15.6 G/DL (ref 14–18)
HYALINE CASTS #/AREA URNS LPF: ABNORMAL /LPF
IMM GRANULOCYTES # BLD AUTO: 0.03 K/UL (ref 0–0.11)
IMM GRANULOCYTES NFR BLD AUTO: 0.5 % (ref 0–0.9)
KETONES UR STRIP.AUTO-MCNC: NEGATIVE MG/DL
LEUKOCYTE ESTERASE UR QL STRIP.AUTO: NEGATIVE
LIPASE SERPL-CCNC: 101 U/L (ref 7–58)
LYMPHOCYTES # BLD AUTO: 1.66 K/UL (ref 1–4.8)
LYMPHOCYTES NFR BLD: 25.8 % (ref 22–41)
MCH RBC QN AUTO: 28 PG (ref 27–33)
MCHC RBC AUTO-ENTMCNC: 30.4 G/DL (ref 33.7–35.3)
MCV RBC AUTO: 91.9 FL (ref 81.4–97.8)
MICRO URNS: ABNORMAL
MONOCYTES # BLD AUTO: 0.52 K/UL (ref 0–0.85)
MONOCYTES NFR BLD AUTO: 8.1 % (ref 0–13.4)
MUCOUS THREADS #/AREA URNS HPF: ABNORMAL /HPF
NEUTROPHILS # BLD AUTO: 4.01 K/UL (ref 1.82–7.42)
NEUTROPHILS NFR BLD: 62.3 % (ref 44–72)
NITRITE UR QL STRIP.AUTO: NEGATIVE
NRBC # BLD AUTO: 0.07 K/UL
NRBC BLD-RTO: 1.1 /100 WBC
PH UR STRIP.AUTO: 5 [PH] (ref 5–8)
PLATELET # BLD AUTO: 186 K/UL (ref 164–446)
PMV BLD AUTO: 11.2 FL (ref 9–12.9)
POTASSIUM SERPL-SCNC: 4.6 MMOL/L (ref 3.6–5.5)
PROT SERPL-MCNC: 6.9 G/DL (ref 6–8.2)
PROT UR QL STRIP: 30 MG/DL
RBC # BLD AUTO: 5.58 M/UL (ref 4.7–6.1)
RBC # URNS HPF: ABNORMAL /HPF
RBC UR QL AUTO: NEGATIVE
SODIUM SERPL-SCNC: 134 MMOL/L (ref 135–145)
SP GR UR STRIP.AUTO: 1.02
UNIDENT CRYS URNS QL MICRO: ABNORMAL /HPF
WBC # BLD AUTO: 6.4 K/UL (ref 4.8–10.8)
WBC #/AREA URNS HPF: ABNORMAL /HPF

## 2020-11-29 PROCEDURE — 85025 COMPLETE CBC W/AUTO DIFF WBC: CPT

## 2020-11-29 PROCEDURE — 81001 URINALYSIS AUTO W/SCOPE: CPT

## 2020-11-29 PROCEDURE — 51702 INSERT TEMP BLADDER CATH: CPT

## 2020-11-29 PROCEDURE — 700105 HCHG RX REV CODE 258: Performed by: EMERGENCY MEDICINE

## 2020-11-29 PROCEDURE — 303105 HCHG CATHETER EXTRA

## 2020-11-29 PROCEDURE — 74177 CT ABD & PELVIS W/CONTRAST: CPT

## 2020-11-29 PROCEDURE — 80053 COMPREHEN METABOLIC PANEL: CPT

## 2020-11-29 PROCEDURE — 83690 ASSAY OF LIPASE: CPT

## 2020-11-29 PROCEDURE — 99284 EMERGENCY DEPT VISIT MOD MDM: CPT

## 2020-11-29 PROCEDURE — 700117 HCHG RX CONTRAST REV CODE 255: Performed by: EMERGENCY MEDICINE

## 2020-11-29 RX ORDER — SODIUM CHLORIDE 9 MG/ML
INJECTION, SOLUTION INTRAVENOUS CONTINUOUS
Status: DISCONTINUED | OUTPATIENT
Start: 2020-11-29 | End: 2020-11-30 | Stop reason: HOSPADM

## 2020-11-29 RX ADMIN — IOHEXOL 100 ML: 350 INJECTION, SOLUTION INTRAVENOUS at 22:30

## 2020-11-29 RX ADMIN — SODIUM CHLORIDE 1000 ML: 9 INJECTION, SOLUTION INTRAVENOUS at 21:30

## 2020-11-30 ENCOUNTER — TELEPHONE (OUTPATIENT)
Dept: VASCULAR LAB | Facility: MEDICAL CENTER | Age: 50
End: 2020-11-30

## 2020-11-30 VITALS
OXYGEN SATURATION: 78 % | RESPIRATION RATE: 15 BRPM | HEART RATE: 109 BPM | DIASTOLIC BLOOD PRESSURE: 89 MMHG | SYSTOLIC BLOOD PRESSURE: 114 MMHG | TEMPERATURE: 96.6 F

## 2020-11-30 NOTE — DISCHARGE INSTRUCTIONS
Follow-up with your primary care provider this week for recheck  Make sure you take your medications daily as directed  No drugs or alcohol.

## 2020-11-30 NOTE — ED PROVIDER NOTES
CHIEF COMPLAINT  Chief Complaint   Patient presents with   • Shortness of Breath     today   • Abdominal Pain     x 2 days       HPI  Matt Ray is a 50 y.o. male who presents tonight with a chief complaint of generalized abdominal pain, shortness of breath. He denies any fever, chills, nausea, vomiting. He states he has been constipated and he cannot pee. He states his feels like his bladder is completely full and he is requesting a catheter. Patient was recently seen at [facility] admitted for congestive heart failure and left AMA. He has a known history of methamphetamine abuse and alcohol abuse. Patient is asking for pain medications.    REVIEW OF SYSTEMS  See HPI for further details. All other system reviews are negative.    PAST MEDICAL HISTORY  Past Medical History:   Diagnosis Date   • Anxiety    • Depression    • HFrEF (heart failure with reduced ejection fraction) ([facility]) 2020   • Hx of appendectomy    • Hypertension    • Left ventricular systolic dysfunction, NYHA class 3 2020   • Peptic ulcer 2017       FAMILY HISTORY  Family History   Problem Relation Age of Onset   • Heart Disease Mother         MI       SOCIAL HISTORY  Social History     Socioeconomic History   • Marital status: Single     Spouse name: Not on file   • Number of children: Not on file   • Years of education: Not on file   • Highest education level: Not on file   Occupational History   • Not on file   Social Needs   • Financial resource strain: Somewhat hard   • Food insecurity     Worry: Never true     Inability: Never true   • Transportation needs     Medical: No     Non-medical: Yes   Tobacco Use   • Smoking status: Former Smoker     Packs/day: 0.25     Years: 30.00     Pack years: 7.50     Types: Cigarettes     Quit date: 2020     Years since quittin.0   • Smokeless tobacco: Never Used   Substance and Sexual Activity   • Alcohol use: Not Currently     Frequency: Never     Comment: used to drink a beer a  day before    • Drug use: Not Currently     Types: Methamphetamines, Inhaled     Comment: last used meth 3 weeks ago   • Sexual activity: Not Currently     Partners: Female   Lifestyle   • Physical activity     Days per week: Not on file     Minutes per session: Not on file   • Stress: Not on file   Relationships   • Social connections     Talks on phone: Not on file     Gets together: Not on file     Attends Bahai service: Not on file     Active member of club or organization: Not on file     Attends meetings of clubs or organizations: Not on file     Relationship status: Not on file   • Intimate partner violence     Fear of current or ex partner: Not on file     Emotionally abused: Not on file     Physically abused: Not on file     Forced sexual activity: Not on file   Other Topics Concern   • Not on file   Social History Narrative   • Not on file       SURGICAL HISTORY  Past Surgical History:   Procedure Laterality Date   • APPENDECTOMY         CURRENT MEDICATIONS  See nurses notes    ALLERGIES  No Known Allergies    PHYSICAL EXAM  VITAL SIGNS: /89   Pulse (!) 109   Temp 35.9 °C (96.6 °F) (Temporal)   Resp 15   SpO2 (!) 78%     Constitutional: Patient is well developed, well nourished in moderate distress from his abdominal pain..   HENT: Normocephalic, atraumatic, Oropharynx moist , nose normal with no mucosal edema or drainage.   Eyes: PERRL, EOMI, Conjunctiva without scleral icterus.  Neck: Supple with  Normal range of motion in flexion, extension and lateral rotation. No tenderness along the bony prominences or paraspinal muscles.   Cardiovascular: Normal heart rate and rhythm. No murmur, Good heart tones.  Thorax & Lungs: Clear and equal breath sounds with good excursion. Mild respiratory distress, no rhonchi, wheezing or rales.  Abdomen: Bowel sounds normal in all four quadrants. Soft, morbidly obese and generalized tenderness with no rebound, guarding or flank tenderness.  Skin: Warm, Dry,  jaundice and very sallow.  Back: No cervical, thoracic, or lumbosacral tenderness.   Extremities: Peripheral pulses 4/4 , 1+ bilateral lower extremity edema, normal range of motion by 4 with no tenderness.  Musculoskeletal: Normal range of motion in all major joints.    Neurologic: Alert & oriented x 3, Normal motor function, Normal sensory function, DTR's 4/4 bilaterally.  Psychiatric: Affect very odd, very inappropriate behavior, yelling and screaming.    EKG  Results for orders placed or performed during the hospital encounter of 11/29/20   CBC WITH DIFFERENTIAL   Result Value Ref Range    WBC 6.4 4.8 - 10.8 K/uL    RBC 5.58 4.70 - 6.10 M/uL    Hemoglobin 15.6 14.0 - 18.0 g/dL    Hematocrit 51.3 42.0 - 52.0 %    MCV 91.9 81.4 - 97.8 fL    MCH 28.0 27.0 - 33.0 pg    MCHC 30.4 (L) 33.7 - 35.3 g/dL    RDW 50.7 (H) 35.9 - 50.0 fL    Platelet Count 186 164 - 446 K/uL    MPV 11.2 9.0 - 12.9 fL    Neutrophils-Polys 62.30 44.00 - 72.00 %    Lymphocytes 25.80 22.00 - 41.00 %    Monocytes 8.10 0.00 - 13.40 %    Eosinophils 2.20 0.00 - 6.90 %    Basophils 1.10 0.00 - 1.80 %    Immature Granulocytes 0.50 0.00 - 0.90 %    Nucleated RBC 1.10 /100 WBC    Neutrophils (Absolute) 4.01 1.82 - 7.42 K/uL    Lymphs (Absolute) 1.66 1.00 - 4.80 K/uL    Monos (Absolute) 0.52 0.00 - 0.85 K/uL    Eos (Absolute) 0.14 0.00 - 0.51 K/uL    Baso (Absolute) 0.07 0.00 - 0.12 K/uL    Immature Granulocytes (abs) 0.03 0.00 - 0.11 K/uL    NRBC (Absolute) 0.07 K/uL   URINALYSIS,CULTURE IF INDICATED    Specimen: Urine, Clean Catch; Blood   Result Value Ref Range    Color Dark Yellow     Character Hazy (A)     Specific Gravity 1.025 <1.035    Ph 5.0 5.0 - 8.0    Glucose Negative Negative mg/dL    Ketones Negative Negative mg/dL    Protein 30 (A) Negative mg/dL    Bilirubin Small (A) Negative    Nitrite Negative Negative    Leukocyte Esterase Negative Negative    Occult Blood Negative Negative    Micro Urine Req Microscopic    URINE MICROSCOPIC (W/UA)    Result Value Ref Range    WBC 0-2 (A) /hpf    RBC 0-2 (A) /hpf    Bacteria Rare (A) None /hpf    Epithelial Cells Rare Few /hpf    Mucous Threads Moderate /hpf    Urine Crystals Few Amorphous /hpf    Hyaline Cast 0-2 /lpf   Comp Metabolic Panel   Result Value Ref Range    Sodium 134 (L) 135 - 145 mmol/L    Potassium 4.6 3.6 - 5.5 mmol/L    Chloride 102 96 - 112 mmol/L    Co2 17 (L) 20 - 33 mmol/L    Anion Gap 15.0 7.0 - 16.0    Glucose 115 (H) 65 - 99 mg/dL    Bun 42 (H) 8 - 22 mg/dL    Creatinine 1.45 (H) 0.50 - 1.40 mg/dL    Calcium 9.1 8.4 - 10.2 mg/dL    AST(SGOT) 49 (H) 12 - 45 U/L    ALT(SGPT) 55 (H) 2 - 50 U/L    Alkaline Phosphatase 145 (H) 30 - 99 U/L    Total Bilirubin 1.3 0.1 - 1.5 mg/dL    Albumin 3.5 3.2 - 4.9 g/dL    Total Protein 6.9 6.0 - 8.2 g/dL    Globulin 3.4 1.9 - 3.5 g/dL    A-G Ratio 1.0 g/dL   LIPASE   Result Value Ref Range    Lipase 101 (H) 7 - 58 U/L   ESTIMATED GFR   Result Value Ref Range    GFR If African American >60 >60 mL/min/1.73 m 2    GFR If Non  51 (A) >60 mL/min/1.73 m 2         RADIOLOGY/PROCEDURES  CT-ABDOMEN-PELVIS WITH   Final Result         1.  Moderate to large quantity of abdominal ascites.   2.  Irregular hepatic contour with low-density changes of the liver, appearance suggests cirrhosis and changes of hepatic steatosis.   3.  Moderate right pleural effusion   4.  2.3 cm nodule in the left lung base, density favors fluid-filled cyst, likely small fluid-filled Bochdalek hernia.   5.  Diverticulosis, abdominal ascites limits evaluation for diverticulitis.   6.  Fat-containing bilateral inguinal hernias   7.  Small fat-containing supraumbilical midline hernia            COURSE & MEDICAL DECISION MAKING  Pertinent Labs & Imaging studies reviewed. (See chart for details)  Patient received an IV of normal saline. He was maintained on cardiac telemetry he was treated for his pain. X-rays performed of his abdomen and pelvis show large quantity of abdominal  ascites irregular liver consistent with cirrhosis moderate right pleural effusion diverticulosis. His lipase is slightly elevated at 101, LFTs show a SGOT of 49 SGPT 55 alkaline phos 145 his BUN and creatinine are 42 and 1.45 respectively consistent with chronic kidney disease. His urinalysis is unremarkable and his overall white count is normal with a stable H&H. After reviewing patient's medical records from [facility] it appears that the patient is extremely noncompliant also uses methamphetamines and drinks regularly. He had signed out AMA from [facility] just a week ago. Currently tonight he is stable and improved so my plan will be to discharge him home to continue his current medications follow-up with [facility] and return if any elevated fever or worsening symptoms. He is discharged in stable and improved condition    FINAL IMPRESSION  1. Shortness of breath  2. Ascites  3. History of congestive heart failure  4. Chronic kidney disease         Electronically signed by: Karen Brooks D.O., 11/30/2020 1:43 [facility] Provider Note

## 2020-11-30 NOTE — TELEPHONE ENCOUNTER
3RD ATTEMPT    Attempted to reach pt regarding anticoaguation appointment. He missed appts on 11/18 AND 11/16 for initial warfarin.      VM box is full, unable to reach pt.   1st Letter sent 11/23     Paxton MarcumD

## 2020-11-30 NOTE — ED NOTES
Pt to er with s/o sob today and abd pain x 2 days. Hx noted for afib an chf, bilat lower extremity edema noted as well as  abd distention. Placed on moniter, appears sr with systolic murmur. Denies pcp orcardiologist , is moaning during triage, unable to state med rec

## 2020-11-30 NOTE — ED NOTES
Pt continues with moaning and crying put-vicente inserted w/o difficulty, drained approx 80cc clear jose urine. Spec to lab

## 2020-11-30 NOTE — ED NOTES
Pt given discharge instructions including to follow up with Dr. Melchor this week and to not use drugs or alcohol.  Pt verbalized understanding of instructions. Out of ED in wheelchair  Taxi voucher provided.

## 2020-11-30 NOTE — ED NOTES
Pt yelling and swearing at staff.  Yelling that the alarm and lights are bothering him.  Yelling because his catheter was removed.  Yelling because his wife didn't answer her phone.  Pt asked mutliple times to stop yelling because of other patients.  Non- compliant.

## 2020-12-07 ENCOUNTER — TELEPHONE (OUTPATIENT)
Dept: VASCULAR LAB | Facility: MEDICAL CENTER | Age: 50
End: 2020-12-07

## 2020-12-07 NOTE — TELEPHONE ENCOUNTER
4th attempt     Attempted to reach pt regarding anticoaguation appointment. He missed appts on 11/18 AND 11/16 for initial warfarin.      LVM to establish care and reschedule missed appt.     Chitra Yap, PaxtonD

## 2020-12-14 ENCOUNTER — TELEPHONE (OUTPATIENT)
Dept: VASCULAR LAB | Facility: MEDICAL CENTER | Age: 50
End: 2020-12-14

## 2020-12-14 NOTE — LETTER
Matt Ray  [address] [facility] [address]    12/14/20      Dear Matt Ray,    We have been unsuccessful in our attempts to contact you regarding your Anticoagulation Service appointments.  Warfarin is a potent blood-thinning agent that requires frequent monitoring to measure its level in the blood.  If your level becomes too high, you could develop serious, sometimes life-threatening bleeding problems.  If your level becomes too low, life-threatening blood clots or stroke could result.     The last recorded INR that we have on file for you is:  INR   Date Value Ref Range Status   11/13/2020 2.52 (H) 0.87 - 1.13 Final     Comment:     INR - Non-therapeutic Reference Range: 0.87-1.13  INR - Therapeutic Reference Range: 2.0-4.0       To monitor your warfarin effectively, we need to be able to communicate with you.  This is a requirement to be followed by our Service.  If we are unable to contact you through repeated occasions, you are at risk of being discharged from the Anticoagulation Service.     It is extremely important that you have your lab work drawn as soon as possible.  Alternatively, you may schedule an appointment for a fingerstick INR at our clinic.  We are open Monday-Friday 8 am until 5 pm.  You may reach our Service at [phone].        Sincerely,           Cornell Rhoades, PharmD, [facility]  Clinic Supervisor  [facility]  Outpatient Anticoagulation Service

## 2020-12-14 NOTE — TELEPHONE ENCOUNTER
5th attempt     Attempted to reach pt regarding anticoaguation appointment. He missed appts on 11/18 AND 11/16 for initial warfarin.      LVM to establish care and reschedule missed appt.     Will send 2nd letter.    Chitra Yap, PaxtonD

## 2020-12-15 ENCOUNTER — DOCUMENTATION (OUTPATIENT)
Dept: MEDICAL GROUP | Facility: MEDICAL CENTER | Age: 50
End: 2020-12-15

## 2020-12-15 ENCOUNTER — HOSPITAL ENCOUNTER (OUTPATIENT)
Dept: LAB | Facility: MEDICAL CENTER | Age: 50
End: 2020-12-15
Attending: FAMILY MEDICINE
Payer: MEDICAID

## 2020-12-15 DIAGNOSIS — I50.82 BIVENTRICULAR FAILURE (HCC): ICD-10-CM

## 2020-12-15 DIAGNOSIS — Z00.00 HEALTHCARE MAINTENANCE: ICD-10-CM

## 2020-12-15 DIAGNOSIS — I50.20 HFREF (HEART FAILURE WITH REDUCED EJECTION FRACTION) (HCC): ICD-10-CM

## 2020-12-15 DIAGNOSIS — I51.3: ICD-10-CM

## 2020-12-15 DIAGNOSIS — I51.3 LEFT VENTRICULAR APICAL THROMBUS: ICD-10-CM

## 2020-12-15 LAB
ALBUMIN SERPL BCP-MCNC: 3.1 G/DL (ref 3.2–4.9)
ALBUMIN/GLOB SERPL: 1 G/DL
ALP SERPL-CCNC: 109 U/L (ref 30–99)
ALT SERPL-CCNC: 22 U/L (ref 2–50)
ANION GAP SERPL CALC-SCNC: 11 MMOL/L (ref 7–16)
AST SERPL-CCNC: 24 U/L (ref 12–45)
BILIRUB SERPL-MCNC: 2.6 MG/DL (ref 0.1–1.5)
BUN SERPL-MCNC: 44 MG/DL (ref 8–22)
CALCIUM SERPL-MCNC: 8.6 MG/DL (ref 8.5–10.5)
CHLORIDE SERPL-SCNC: 100 MMOL/L (ref 96–112)
CHOLEST SERPL-MCNC: 99 MG/DL (ref 100–199)
CO2 SERPL-SCNC: 18 MMOL/L (ref 20–33)
CREAT SERPL-MCNC: 1.34 MG/DL (ref 0.5–1.4)
ERYTHROCYTE [DISTWIDTH] IN BLOOD BY AUTOMATED COUNT: 50.1 FL (ref 35.9–50)
EST. AVERAGE GLUCOSE BLD GHB EST-MCNC: 146 MG/DL
FASTING STATUS PATIENT QL REPORTED: NORMAL
GLOBULIN SER CALC-MCNC: 3.1 G/DL (ref 1.9–3.5)
GLUCOSE SERPL-MCNC: 95 MG/DL (ref 65–99)
HBA1C MFR BLD: 6.7 % (ref 0–5.6)
HCT VFR BLD AUTO: 47.6 % (ref 42–52)
HDLC SERPL-MCNC: 26 MG/DL
HGB BLD-MCNC: 14.9 G/DL (ref 14–18)
INR PPP: 3.49 (ref 0.87–1.13)
LDLC SERPL CALC-MCNC: 50 MG/DL
MCH RBC QN AUTO: 27.2 PG (ref 27–33)
MCHC RBC AUTO-ENTMCNC: 31.3 G/DL (ref 33.7–35.3)
MCV RBC AUTO: 87 FL (ref 81.4–97.8)
PLATELET # BLD AUTO: 184 K/UL (ref 164–446)
PMV BLD AUTO: 11.3 FL (ref 9–12.9)
POTASSIUM SERPL-SCNC: 4.8 MMOL/L (ref 3.6–5.5)
PROT SERPL-MCNC: 6.2 G/DL (ref 6–8.2)
PROTHROMBIN TIME: 36.1 SEC (ref 12–14.6)
RBC # BLD AUTO: 5.47 M/UL (ref 4.7–6.1)
SODIUM SERPL-SCNC: 129 MMOL/L (ref 135–145)
TRIGL SERPL-MCNC: 113 MG/DL (ref 0–149)
WBC # BLD AUTO: 7 K/UL (ref 4.8–10.8)

## 2020-12-15 PROCEDURE — 85027 COMPLETE CBC AUTOMATED: CPT

## 2020-12-15 PROCEDURE — 80053 COMPREHEN METABOLIC PANEL: CPT

## 2020-12-15 PROCEDURE — 80061 LIPID PANEL: CPT

## 2020-12-15 PROCEDURE — 85610 PROTHROMBIN TIME: CPT

## 2020-12-15 PROCEDURE — 83036 HEMOGLOBIN GLYCOSYLATED A1C: CPT

## 2020-12-15 PROCEDURE — 36415 COLL VENOUS BLD VENIPUNCTURE: CPT

## 2020-12-15 NOTE — PROGRESS NOTES
S/w pt's partner. She prefers pt to get lab drawn today. Placed standing lab order.    Chitra Yap, PaxtonD

## 2020-12-16 ENCOUNTER — TELEPHONE (OUTPATIENT)
Dept: VASCULAR LAB | Facility: MEDICAL CENTER | Age: 50
End: 2020-12-16

## 2020-12-16 ENCOUNTER — ANTICOAGULATION MONITORING (OUTPATIENT)
Dept: VASCULAR LAB | Facility: MEDICAL CENTER | Age: 50
End: 2020-12-16

## 2020-12-16 DIAGNOSIS — I51.3: ICD-10-CM

## 2020-12-16 DIAGNOSIS — I51.3 LEFT VENTRICULAR APICAL THROMBUS: ICD-10-CM

## 2020-12-16 PROBLEM — I26.99 PULMONARY EMBOLISM (HCC): Status: ACTIVE | Noted: 2020-12-16

## 2020-12-16 NOTE — PROGRESS NOTES
.  Anticoagulation Summary  As of 12/16/2020    INR goal:  2.0-3.0   TTR:  --   INR used for dosing:  3.49 (12/15/2020)   Warfarin maintenance plan:  2 mg (2 mg x 1) every day   Weekly warfarin total:  14 mg   Plan last modified:  Chitra Yap, PharmD (12/16/2020)   Next INR check:  12/18/2020   Target end date:  6/16/2021    Indications    Left ventricular apical thrombus [I51.3]  Right ventricular apical thrombus [I51.3]  Pulmonary embolism (HCC) [I26.99]             Anticoagulation Episode Summary     INR check location:      Preferred lab:      Send INR reminders to:      Comments:        Anticoagulation Care Providers     Provider Role Specialty Phone number    Radha Melchor M.D. Referring Family Medicine [phone]    [facility]  [phone]        Anticoagulation Patient Findings      PCP Radha Melchor M.D.  Cardiologist Needs to establish with cardiology    Pt hx - Pt had a hx of PE sometime in October, then was readmitted in November for biventricular thrombus. He was started on OAC with warfarin.    CHADSVASC = n/a  HAS-BLED = 1 (supposed to be on prasurgel d/t hx of CAD/stent in June, but pt reports PCP stopped prasurgrel)    DOAC = not indicated in LV + RV thrombi    Target end date = tbd    Pt is new to warfarin and new to [facility]. Discussed:   · Indication for warfarin therapy and INR goal range.   · Importance of monitoring and compliance.   · Monitoring parameters, signs and symptoms of bleeding or clotting.  · Warfarin therapy, side effects, potential DDIs, OTC medications  · Antiplatelet - supposed to be on prasugrel - pt needs to establish with cardiology; provided pt with [facility] phone number  · Importance of diet consistency, ie vitamin K intake, supplements  · Lifestyle safety, ie smoking, ETOH, hobby safety, fall safety/prevention  · Procedures for missed doses or suspected missed doses, surgeries/procedures, travel, dental work, any medication  changes    Pt denies any unusual s/s of bleeding, bruising, clotting or any changes to diet or medications.    INR was supratherapeutic yesterday.   Will continue to titrate pt's warfarin dose. Pt's been taking 2 mg daily   Will have pt reduce weekly regimen and take 1 mg on Wednesday, 2 mg AOD.    Recheck INR in 3 days via lab.  Scheduled f/u appt in clinic face to face for Monday 12/21    Chitra Yap, PharmD    Added [facility] to care team   Send to Bloch

## 2020-12-16 NOTE — PROGRESS NOTES
Received supratherapeutic INR   Goal INR is 2-3 for biventricular thrombus  Attempted to call patient and contact for patient's spouse. VM full on both numbers. Will try again tomorrow.   [employer] message sent.

## 2020-12-16 NOTE — TELEPHONE ENCOUNTER
Initial anticoagulation clinic note and most recent PCP note reviewed    Patient on anticoagulation with warfarin at the direction of PCP for biventricular ventricular thrombus in the setting of significant cardiomyopathy with recent history of PE    Will continue with 6 months of oral anticoagulation and then check back with cardiology and or PCP to determine whether or not it should be continued    We will leave determination as whether or not patient should be on concomitant aspirin for recent coronary stenting to cardiology -we will continue to hold for now    We will defer all further cv care, aside from day to day management of anticoagulation to cardiology and PCP    Michael Bloch, MD  Anticoagulation Clinic    Cc: Radha Melchor

## 2020-12-18 ENCOUNTER — TELEPHONE (OUTPATIENT)
Dept: MEDICAL GROUP | Facility: MEDICAL CENTER | Age: 50
End: 2020-12-18

## 2020-12-21 ENCOUNTER — OFFICE VISIT (OUTPATIENT)
Dept: MEDICAL GROUP | Facility: MEDICAL CENTER | Age: 50
End: 2020-12-21
Attending: FAMILY MEDICINE
Payer: MEDICAID

## 2020-12-21 VITALS
OXYGEN SATURATION: 97 % | BODY MASS INDEX: 34.37 KG/M2 | RESPIRATION RATE: 16 BRPM | TEMPERATURE: 97.8 F | SYSTOLIC BLOOD PRESSURE: 108 MMHG | WEIGHT: 219 LBS | DIASTOLIC BLOOD PRESSURE: 70 MMHG | HEIGHT: 67 IN | HEART RATE: 98 BPM

## 2020-12-21 DIAGNOSIS — I50.20 HFREF (HEART FAILURE WITH REDUCED EJECTION FRACTION) (HCC): ICD-10-CM

## 2020-12-21 DIAGNOSIS — I51.3: ICD-10-CM

## 2020-12-21 DIAGNOSIS — F32.2 CURRENT SEVERE EPISODE OF MAJOR DEPRESSIVE DISORDER WITHOUT PSYCHOTIC FEATURES WITHOUT PRIOR EPISODE (HCC): ICD-10-CM

## 2020-12-21 DIAGNOSIS — I51.3 LEFT VENTRICULAR APICAL THROMBUS: ICD-10-CM

## 2020-12-21 DIAGNOSIS — F41.1 GAD (GENERALIZED ANXIETY DISORDER): ICD-10-CM

## 2020-12-21 PROCEDURE — 99213 OFFICE O/P EST LOW 20 MIN: CPT | Performed by: FAMILY MEDICINE

## 2020-12-21 PROCEDURE — 99214 OFFICE O/P EST MOD 30 MIN: CPT | Performed by: FAMILY MEDICINE

## 2020-12-21 RX ORDER — FUROSEMIDE 80 MG
TABLET ORAL
COMMUNITY
Start: 2020-12-15 | End: 2020-12-21

## 2020-12-21 RX ORDER — FUROSEMIDE 80 MG
80 TABLET ORAL
Qty: 60 TAB | Refills: 2 | Status: SHIPPED | OUTPATIENT
Start: 2020-12-21

## 2020-12-21 RX ORDER — FUROSEMIDE 80 MG
80 TABLET ORAL
Qty: 60 TAB | Refills: 2 | Status: SHIPPED | OUTPATIENT
Start: 2020-12-21 | End: 2020-12-21

## 2020-12-21 RX ORDER — TRAZODONE HYDROCHLORIDE 100 MG/1
100 TABLET ORAL
Qty: 30 TAB | Refills: 2 | Status: SHIPPED | OUTPATIENT
Start: 2020-12-21

## 2020-12-21 RX ORDER — CARVEDILOL 6.25 MG/1
6.25 TABLET ORAL
COMMUNITY
Start: 2020-12-15

## 2020-12-21 RX ORDER — ATORVASTATIN CALCIUM 40 MG/1
40 TABLET, FILM COATED ORAL
COMMUNITY
Start: 2020-12-12

## 2020-12-21 RX ORDER — MORPHINE SULFATE 4 MG/ML
INJECTION, SOLUTION INTRAMUSCULAR; INTRAVENOUS
COMMUNITY
Start: 2020-10-27 | End: 2020-12-21

## 2020-12-21 RX ORDER — ASPIRIN 81 MG/1
TABLET, CHEWABLE ORAL
COMMUNITY
Start: 2020-10-27

## 2020-12-21 RX ORDER — FUROSEMIDE 10 MG/ML
INJECTION INTRAMUSCULAR; INTRAVENOUS
COMMUNITY
Start: 2020-10-27 | End: 2020-12-21

## 2020-12-21 RX ORDER — WARFARIN SODIUM 2 MG/1
TABLET ORAL
Qty: 26 TAB | Refills: 2 | Status: SHIPPED | OUTPATIENT
Start: 2020-12-21

## 2020-12-21 RX ORDER — HEPARIN SODIUM 1000 [USP'U]/ML
INJECTION, SOLUTION INTRAVENOUS; SUBCUTANEOUS
COMMUNITY
Start: 2020-10-27 | End: 2020-12-21

## 2020-12-21 RX ORDER — BUSPIRONE HYDROCHLORIDE 10 MG/1
5 TABLET ORAL 2 TIMES DAILY
Qty: 60 TAB | Refills: 2 | Status: SHIPPED | OUTPATIENT
Start: 2020-12-21

## 2020-12-21 ASSESSMENT — ANXIETY QUESTIONNAIRES
4. TROUBLE RELAXING: NEARLY EVERY DAY
3. WORRYING TOO MUCH ABOUT DIFFERENT THINGS: NEARLY EVERY DAY
7. FEELING AFRAID AS IF SOMETHING AWFUL MIGHT HAPPEN: NOT AT ALL
1. FEELING NERVOUS, ANXIOUS, OR ON EDGE: NEARLY EVERY DAY
2. NOT BEING ABLE TO STOP OR CONTROL WORRYING: NEARLY EVERY DAY
GAD7 TOTAL SCORE: 15
6. BECOMING EASILY ANNOYED OR IRRITABLE: SEVERAL DAYS
5. BEING SO RESTLESS THAT IT IS HARD TO SIT STILL: MORE THAN HALF THE DAYS

## 2020-12-21 ASSESSMENT — FIBROSIS 4 INDEX: FIB4 SCORE: 1.42

## 2020-12-21 NOTE — ASSESSMENT & PLAN NOTE
Still quite anxious, mostly at nighttime  Took buspar 5mg bid, but ran out so hasn't had it in a few days. Felt slight improvement  Also on trazodone - helped with anxiety for a few nights, but after that it didn't help too much.

## 2020-12-21 NOTE — LETTER
[facility]  Radha Melchor M.D.  [address]  [place] [address]  Fax: [phone]   Authorization for Release/Disclosure of   Protected Health Information   Name: CATRACHITO WOODS : 1970 SSN: [id number]   Address: [address] [place] [address] Phone:    [phone] (home)    I authorize the entity listed below to release/disclose the PHI below to:   [facility]/Radha Melchor M.D. and Radha Melchor M.D.   Provider or Entity Name:  [facility] inpatient records   Address   City, State, Zip   Phone:      Fax:     Reason for request: continuity of care   Information to be released:    [  ] LAST COLONOSCOPY,  including any PATH REPORT and follow-up  [  ] LAST FIT/COLOGUARD RESULT [  ] LAST DEXA  [  ] LAST MAMMOGRAM  [  ] LAST PAP  [  ] LAST LABS [  ] RETINA EXAM REPORT  [  ] IMMUNIZATION RECORDS  [ X ] Release all info      [  ] Check here and initial the line next to each item to release ALL health information INCLUDING  _____ Care and treatment for drug and / or alcohol abuse  _____ HIV testing, infection status, or AIDS  _____ Genetic Testing    DATES OF SERVICE OR TIME PERIOD TO BE DISCLOSED: _____________  I understand and acknowledge that:  * This Authorization may be revoked at any time by you in writing, except if your health information has already been used or disclosed.  * Your health information that will be used or disclosed as a result of you signing this authorization could be re-disclosed by the recipient. If this occurs, your re-disclosed health information may no longer be protected by State or Federal laws.  * You may refuse to sign this Authorization. Your refusal will not affect your ability to obtain treatment.  * This Authorization becomes effective upon signing and will  on (date) __________.      If no date is indicated, this Authorization will  one (1) year from the signature date.    Name: Catracihto Woods    Signature:   Date:     2020       PLEASE FAX  REQUESTED RECORDS BACK TO: [phone]

## 2020-12-21 NOTE — LETTER
[facility]  Radha Melchor M.D.  [address] [place] [address]  Fax: [phone]   Authorization for Release/Disclosure of   Protected Health Information   Name: CATRACHITO WOODS : 1970 SSN: [id number]   Address: [address] [place] [address] Phone:    [phone] (home)    I authorize the entity listed below to release/disclose the PHI below to:   [facility]/Radha Melchor M.D. and Radha Melchor M.D.   Provider or Entity Name:  [facility] [place]   Phone:  [phone]    Fax:     Reason for request: continuity of care   Information to be released:    [  ] LAST COLONOSCOPY,  including any PATH REPORT and follow-up  [  ] LAST FIT/COLOGUARD RESULT [  ] LAST DEXA  [  ] LAST MAMMOGRAM  [  ] LAST PAP  [  ] LAST LABS [  ] RETINA EXAM REPORT  [  ] IMMUNIZATION RECORDS  [ X ] Release all info      [  ] Check here and initial the line next to each item to release ALL health information INCLUDING  _____ Care and treatment for drug and / or alcohol abuse  _____ HIV testing, infection status, or AIDS  _____ Genetic Testing    DATES OF SERVICE OR TIME PERIOD TO BE DISCLOSED: _____________  I understand and acknowledge that:  * This Authorization may be revoked at any time by you in writing, except if your health information has already been used or disclosed.  * Your health information that will be used or disclosed as a result of you signing this authorization could be re-disclosed by the recipient. If this occurs, your re-disclosed health information may no longer be protected by State or Federal laws.  * You may refuse to sign this Authorization. Your refusal will not affect your ability to obtain treatment.  * This Authorization becomes effective upon signing and will  on (date) __________.      If no date is indicated, this Authorization will  one (1) year from the signature date.    Name: Catrachito Woods    Signature:   Date:     2020       PLEASE FAX  REQUESTED RECORDS BACK TO: [phone]

## 2020-12-21 NOTE — PROGRESS NOTES
Subjective:     CC: f/u    HPI:     JOHN PAUL (generalized anxiety disorder)  Still quite anxious, mostly at nighttime  Took buspar 5mg bid, but ran out so hasn't had it in a few days. Felt slight improvement  Also on trazodone - helped with anxiety for a few nights, but after that it didn't help too much.     Left ventricular apical thrombus  Patient has not taken his warfarin in a week - he was told to decrease to 1mg once a week and 2mg AOD, but he mistook this to take 2mg once a week. His last dose was over 2020    HFrEF (heart failure with reduced ejection fraction) ([facility])  Apparently went to cardiology 1-2 weeks ago for initial evaluation, was told to take furosemide 80mg daily. He thinks he was taking 40mg daily (although our instructions are for 40mg bid)  Still c/o significant fluid on the legs  Lasix 80mg once a day doesn't seem to increase urination at all  No dizziness  Was initially started on antiplatelet (prasugrel) but he thinks someone told him to stop this medication  Has not been taking ASA daily  Limiting to 60 ounces of fluid a day, not too much salt      Has a hard time remembering his appointments and tends to miss them     Past Medical History:   Diagnosis Date   • Anxiety    • Depression    • HFrEF (heart failure with reduced ejection fraction) ([facility]) 2020   • Hx of appendectomy    • Hypertension    • Left ventricular systolic dysfunction, NYHA class 3 2020   • Peptic ulcer 2017       Social History     Tobacco Use   • Smoking status: Former Smoker     Packs/day: 0.25     Years: 30.00     Pack years: 7.50     Types: Cigarettes     Quit date: 2020     Years since quittin.1   • Smokeless tobacco: Never Used   Substance Use Topics   • Alcohol use: Not Currently     Frequency: Never     Comment: used to drink a beer a day before    • Drug use: Not Currently     Types: Methamphetamines, Inhaled     Comment: last used meth 3 weeks ago       Current Outpatient Medications Ordered  "in Epic   Medication Sig Dispense Refill   • traZODone (DESYREL) 100 MG Tab Take 1 Tab by mouth every bedtime. 30 Tab 2   • busPIRone (BUSPAR) 10 MG Tab tablet Take 0.5 Tabs by mouth 2 times a day. 60 Tab 2   • warfarin (COUMADIN) 2 MG Tab Take 0.5 tab on wednesdays, 1 tablet all other days 26 Tab 2   • furosemide (LASIX) 80 MG Tab Take 1 Tab by mouth 2 (two) times a day. 60 Tab 2   • ASPIRIN LOW STRENGTH 81 MG Chew Tab chewable tablet      • carvedilol (COREG) 6.25 MG Tab Take 6.25 mg by mouth. TWICE A DAY WITH FOOD     • atorvastatin (LIPITOR) 40 MG Tab Take 40 mg by mouth every day.     • lisinopril (PRINIVIL) 5 MG Tab TAKE 1 TABLET BY MOUTH EVERY DAY 30 Tab 5   • polyethylene glycol 3350 (MIRALAX) 17 GM/SCOOP Powder Take 17 g by mouth every day. 507 g 2   • spironolactone (ALDACTONE) 25 MG Tab Take 1 Tab by mouth every day. 30 Tab 1     No current Epic-ordered facility-administered medications on file.        Allergies:  Patient has no known allergies.    Health Maintenance: Completed    ROS:  Gen: (+) chills  Pulm: (+) cough at nighttime  CV: (+) LE edema, (+) orthopnea  GI: no abd pain  : no dysuria  Psych: Anxiety      Objective:       Exam:  /70 (BP Location: Left arm, Patient Position: Sitting, BP Cuff Size: Adult)   Pulse 98   Temp 36.6 °C (97.8 °F) (Temporal)   Resp 16   Ht 1.702 m (5' 7\")   Wt 99.3 kg (219 lb)   SpO2 97%   BMI 34.30 kg/m²  Body mass index is 34.3 kg/m².  25 lb increase since last visit    Gen: No apparent distress.  Neck: Neck is supple (+) JVD  Lungs: Normal effort, CTA bilaterally, no wheezes, rhonchi, or rales.  Dull at the bases.  CV: Regular rate and rhythm. No murmurs, rubs, or gallops. 3+ pitting edema b/l LE up to mid abdomen  Abd: NABS, soft, nontender, nondistended  MSK: Fingers without clubbing or cyanosis. Normal gait   Derm: Warm and dry. No visible rashes  Psy: Alert and oriented, Normal mood and affect. Judgment normal      Labs: reviewed from 1 week ago, " hyponatremia, k is normal, GFR > 60, T bili elevated. INR elevated at the time    Assessment & Plan:     50 y.o. male with the following -     1. HFrEF (heart failure with reduced ejection fraction) ([facility])  - Basic Metabolic Panel; Future  - furosemide (LASIX) 80 MG Tab; Take 1 Tab by mouth 2 (two) times a day.  Dispense: 60 Tab; Refill: 2  Patient is still significantly fluid overloaded on exam.  I have advised patient to increase his oral furosemide to 80 mg twice a day.  He is to get BMP in 1 week.  He does not have chest pain, shortness of breath, however I have advised him indications to go to the emergency room.  We will have close follow-up, I am concerned that he might need to get IV Lasix.  He seems to have difficulty keeping his appointment straight.  I have advised him to get a calendar and write his appointments down whenever he gets a new appointment.  He does have a significant other who is helping him with management of this.  Records requested from his cardiologist    2. Right ventricular apical thrombus  - warfarin (COUMADIN) 2 MG Tab; Take 0.5 tab on wednesdays, 1 tablet all other days  Dispense: 26 Tab; Refill: 2  Patient misunderstood the [employer] message regarding his warfarin.  He has not been taking it since last week.  He has an appointment with the anticoagulation clinic coming up in an hour, I reminded him of this appointment and will defer starting Lovenox to them.  I reviewed with him how he should be taking his Coumadin.    3. Left ventricular apical thrombus  - warfarin (COUMADIN) 2 MG Tab; Take 0.5 tab on wednesdays, 1 tablet all other days  Dispense: 26 Tab; Refill: 2  Per #3    4. JOHN PAUL (generalized anxiety disorder)  - busPIRone (BUSPAR) 10 MG Tab tablet; Take 0.5 Tabs by mouth 2 times a day.  Dispense: 60 Tab; Refill: 2  He reported small benefit with BuSpar, will increase to 10 mg twice a day.    5. Current severe episode of major depressive disorder without psychotic features without  prior episode (HCC)  - traZODone (DESYREL) 100 MG Tab; Take 1 Tab by mouth every bedtime.  Dispense: 30 Tab; Refill: 2  He reported some benefit with trazodone at nighttime with sleep, he states that it calmed him down a little bit to help him sleep.  We will increase to 100 mg nightly.      Return in about 2 weeks (around 1/4/2021).    Please note that this dictation was created using voice recognition software. I have made every reasonable attempt to correct obvious errors, but I expect that there are errors of grammar and possibly content that I did not discover before finalizing the note.

## 2020-12-21 NOTE — PATIENT INSTRUCTIONS
Changed medications:    Increased FUROSEMIDE (Lasix) to 80mg twice a day  Increased TRAZODONE to 100mg nightly (take 2 tabs of 50 mg until the bottle is done, then take 1 tablet of the new prescription  Increased BUSPAR to 10mg twice a day (once in the morning, once in the afternoon)  Warfarin - take 1mg (1/2 tablet) on wednesdays and 2mg all other days     Labs:  Complete labs in 1 week. No paperwork needed

## 2020-12-22 ENCOUNTER — TELEPHONE (OUTPATIENT)
Dept: VASCULAR LAB | Facility: MEDICAL CENTER | Age: 50
End: 2020-12-22

## 2020-12-22 NOTE — ASSESSMENT & PLAN NOTE
Apparently went to cardiology 1-2 weeks ago for initial evaluation, was told to take furosemide 80mg daily. He thinks he was taking 40mg daily (although our instructions are for 40mg bid)  Still c/o significant fluid on the legs  Lasix 80mg once a day doesn't seem to increase urination at all  No dizziness  Was initially started on antiplatelet (prasugrel) but he thinks someone told him to stop this medication  Has not been taking ASA daily  Limiting to 60 ounces of fluid a day, not too much salt

## 2020-12-22 NOTE — TELEPHONE ENCOUNTER
Pt missed their f/u anticoagulation appointment on 12/21.  Unable to LVM to reschedule. Will send [employer] message.    Chitra Yap, PaxtonD

## 2020-12-22 NOTE — ASSESSMENT & PLAN NOTE
Patient has not taken his warfarin in a week - he was told to decrease to 1mg once a week and 2mg AOD, but he mistook this to take 2mg once a week. His last dose was over 12/16/2020

## 2020-12-23 ENCOUNTER — TELEPHONE (OUTPATIENT)
Dept: VASCULAR LAB | Facility: MEDICAL CENTER | Age: 50
End: 2020-12-23

## 2020-12-23 PROBLEM — I25.2 HISTORY OF NON-ST ELEVATION MYOCARDIAL INFARCTION (NSTEMI): Status: ACTIVE | Noted: 2020-12-23

## 2020-12-23 NOTE — TELEPHONE ENCOUNTER
Pt missed their f/u anticoagulation appointment on 12/21.  No response to [employer] message sent on 12/22.  Unable to LVM on pt's phone to reschedule.   LVM with pt's partner Adelaide to reschedule.     Chitra Yap, PharmD

## 2020-12-28 ENCOUNTER — TELEPHONE (OUTPATIENT)
Dept: VASCULAR LAB | Facility: MEDICAL CENTER | Age: 50
End: 2020-12-28

## 2020-12-28 NOTE — TELEPHONE ENCOUNTER
Pt missed their f/u anticoagulation appointment on 12/21.  No response to [employer] message sent on 12/22.  Unable to LVM on pt's phone to reschedule.   LVM with pt's partner Adelaide to reschedule.     Chitra Yap, PharmD

## 2020-12-29 ENCOUNTER — TELEPHONE (OUTPATIENT)
Dept: CARDIOLOGY | Facility: MEDICAL CENTER | Age: 50
End: 2020-12-29

## 2020-12-29 NOTE — TELEPHONE ENCOUNTER
Attempted to reach out to patient regarding NP appointment with [facility] on 01/07/2021. Primarily intereted to see if they have seen a cardiologist.     Unable to leave voicemail due to subscriber issue??

## 2020-12-31 ENCOUNTER — HOSPITAL ENCOUNTER (OUTPATIENT)
Dept: LAB | Facility: MEDICAL CENTER | Age: 50
End: 2020-12-31
Attending: FAMILY MEDICINE
Payer: MEDICAID

## 2020-12-31 ENCOUNTER — ANTICOAGULATION MONITORING (OUTPATIENT)
Dept: VASCULAR LAB | Facility: MEDICAL CENTER | Age: 50
End: 2020-12-31

## 2020-12-31 DIAGNOSIS — I50.20 HFREF (HEART FAILURE WITH REDUCED EJECTION FRACTION) (HCC): ICD-10-CM

## 2020-12-31 DIAGNOSIS — I51.3: ICD-10-CM

## 2020-12-31 DIAGNOSIS — I51.3 LEFT VENTRICULAR APICAL THROMBUS: ICD-10-CM

## 2020-12-31 LAB
ANION GAP SERPL CALC-SCNC: 13 MMOL/L (ref 7–16)
BUN SERPL-MCNC: 20 MG/DL (ref 8–22)
CALCIUM SERPL-MCNC: 8.5 MG/DL (ref 8.5–10.5)
CHLORIDE SERPL-SCNC: 100 MMOL/L (ref 96–112)
CO2 SERPL-SCNC: 25 MMOL/L (ref 20–33)
CREAT SERPL-MCNC: 0.89 MG/DL (ref 0.5–1.4)
GLUCOSE SERPL-MCNC: 145 MG/DL (ref 65–99)
INR PPP: 1.19 (ref 0.87–1.13)
POTASSIUM SERPL-SCNC: 3.7 MMOL/L (ref 3.6–5.5)
PROTHROMBIN TIME: 15.5 SEC (ref 12–14.6)
SODIUM SERPL-SCNC: 138 MMOL/L (ref 135–145)

## 2020-12-31 PROCEDURE — 80048 BASIC METABOLIC PNL TOTAL CA: CPT

## 2020-12-31 PROCEDURE — 36415 COLL VENOUS BLD VENIPUNCTURE: CPT

## 2020-12-31 PROCEDURE — 85610 PROTHROMBIN TIME: CPT

## 2020-12-31 NOTE — PROGRESS NOTES
Missed f/uanticoagulation appointment on 12/21.  No response to MyChart message sent on 12/22.  Unable to LVM on pt's phone to reschedule.   Sent Letter.     Paxton GarciaD

## 2020-12-31 NOTE — LETTER
Matt Ray  [address] [facility] [address]    12/31/20    Dear Matt Ray ,    We have been unsuccessful in our attempts to contact you regarding your Anticoagulation Service appointments. WARFARIN is a potent blood-thinning agent that requires monitoring to ensure that the dosage is correct for your body.  If it isn't, you could develop serious, sometimes life-threatening bleeding problems or life-threatening blood clots or stroke could result.    To monitor you effectively, we need to be able to communicate with you.  This is a requirement to be followed by our Service.       If you repeatedly fail to keep your lab appointments, you are at risk of being discharged from the Anticoagulation Service.    It is extremely important that you contact the clinic as soon as possible to arrange appropriate follow up.  We are open Monday-Friday 8 am until 5 pm.  You may reach our Service at [phone].           Sincerely,           Cornell Rhoades, PharmD, [facility]  Clinic Supervisor  [facility]  Outpatient Anticoagulation Service

## 2021-01-04 ENCOUNTER — TELEPHONE (OUTPATIENT)
Dept: MEDICAL GROUP | Facility: MEDICAL CENTER | Age: 51
End: 2021-01-04

## 2021-01-04 NOTE — TELEPHONE ENCOUNTER
After receiving confirmatory documentation, discussed patient's medical conditions with an [facility].  Patient experienced severe reduced ejection congestive heart failure recently not well controlled.  Patient also had apical thrombus in the left ventricle.  Investigating officer said his medications showed quantities that were not consistent with taking them as ordered.  I strongly suspect that patient  of acute myocardial infarction with contributions from his severe uncontrolled congestive heart failure and apical ventricular thrombus.  Dr. Corrales

## 2021-01-04 NOTE — TELEPHONE ENCOUNTER
1. 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 He is also going to send of a medical from to us    Would like a call to his cell phone

## 2021-01-06 ENCOUNTER — ANTICOAGULATION MONITORING (OUTPATIENT)
Dept: VASCULAR LAB | Facility: MEDICAL CENTER | Age: 51
End: 2021-01-06

## 2021-01-06 DIAGNOSIS — I51.3 LEFT VENTRICULAR APICAL THROMBUS: ICD-10-CM

## 2021-01-06 DIAGNOSIS — I51.3: ICD-10-CM

## 2021-01-07 ENCOUNTER — APPOINTMENT (OUTPATIENT)
Dept: CARDIOLOGY | Facility: PHYSICIAN GROUP | Age: 51
End: 2021-01-07
Payer: MEDICAID

## 2021-01-07 NOTE — PROGRESS NOTES
Per telephone note on 1/4/21, pt passed away on 1/1.  Will d/c from anticoagulation services.    Chitra Yap, PharmD    CC Dr Bloch

## 2021-03-07 DIAGNOSIS — I50.20 HFREF (HEART FAILURE WITH REDUCED EJECTION FRACTION) (HCC): ICD-10-CM

## 2021-03-07 RX ORDER — LISINOPRIL 5 MG/1
TABLET ORAL
Qty: 90 TABLET | Refills: 1 | OUTPATIENT
Start: 2021-03-07